# Patient Record
Sex: FEMALE | Race: WHITE | NOT HISPANIC OR LATINO | Employment: OTHER | URBAN - METROPOLITAN AREA
[De-identification: names, ages, dates, MRNs, and addresses within clinical notes are randomized per-mention and may not be internally consistent; named-entity substitution may affect disease eponyms.]

---

## 2017-08-10 ENCOUNTER — ALLSCRIPTS OFFICE VISIT (OUTPATIENT)
Dept: OTHER | Facility: OTHER | Age: 32
End: 2017-08-10

## 2017-08-10 ENCOUNTER — APPOINTMENT (OUTPATIENT)
Dept: RADIOLOGY | Facility: OTHER | Age: 32
End: 2017-08-10
Payer: COMMERCIAL

## 2017-08-10 ENCOUNTER — TRANSCRIBE ORDERS (OUTPATIENT)
Dept: ADMINISTRATIVE | Facility: HOSPITAL | Age: 32
End: 2017-08-10

## 2017-08-10 DIAGNOSIS — M25.561 RIGHT KNEE PAIN, UNSPECIFIED CHRONICITY: Primary | ICD-10-CM

## 2017-08-10 DIAGNOSIS — S83.511A SPRAIN OF ANTERIOR CRUCIATE LIGAMENT OF RIGHT KNEE: ICD-10-CM

## 2017-08-10 DIAGNOSIS — M25.561 PAIN IN RIGHT KNEE: ICD-10-CM

## 2017-08-10 PROCEDURE — 73564 X-RAY EXAM KNEE 4 OR MORE: CPT

## 2017-08-10 PROCEDURE — 73562 X-RAY EXAM OF KNEE 3: CPT

## 2017-08-11 ENCOUNTER — HOSPITAL ENCOUNTER (OUTPATIENT)
Dept: RADIOLOGY | Age: 32
Discharge: HOME | End: 2017-08-11
Payer: COMMERCIAL

## 2017-08-11 DIAGNOSIS — M25.561 RIGHT KNEE PAIN, UNSPECIFIED CHRONICITY: ICD-10-CM

## 2017-08-11 PROCEDURE — 73721 MRI JNT OF LWR EXTRE W/O DYE: CPT

## 2017-08-14 ENCOUNTER — ALLSCRIPTS OFFICE VISIT (OUTPATIENT)
Dept: OTHER | Facility: OTHER | Age: 32
End: 2017-08-14

## 2017-08-17 ENCOUNTER — GENERIC CONVERSION - ENCOUNTER (OUTPATIENT)
Dept: OTHER | Facility: OTHER | Age: 32
End: 2017-08-17

## 2017-08-29 ENCOUNTER — ALLSCRIPTS OFFICE VISIT (OUTPATIENT)
Dept: OTHER | Facility: OTHER | Age: 32
End: 2017-08-29

## 2017-09-06 RX ORDER — NORGESTIMATE AND ETHINYL ESTRADIOL 7DAYSX3 LO
1 KIT ORAL DAILY
COMMUNITY
End: 2018-02-19 | Stop reason: SDUPTHER

## 2017-09-15 ENCOUNTER — HOSPITAL ENCOUNTER (OUTPATIENT)
Facility: HOSPITAL | Age: 32
Setting detail: OUTPATIENT SURGERY
Discharge: HOME | End: 2017-09-15
Attending: ORTHOPAEDIC SURGERY | Admitting: ORTHOPAEDIC SURGERY
Payer: COMMERCIAL

## 2017-09-15 ENCOUNTER — ANESTHESIA (OUTPATIENT)
Dept: PERIOP | Facility: HOSPITAL | Age: 32
End: 2017-09-15
Payer: COMMERCIAL

## 2017-09-15 ENCOUNTER — ANESTHESIA EVENT (OUTPATIENT)
Dept: PERIOP | Facility: HOSPITAL | Age: 32
End: 2017-09-15
Payer: COMMERCIAL

## 2017-09-15 ENCOUNTER — APPOINTMENT (OUTPATIENT)
Dept: RADIOLOGY | Facility: HOSPITAL | Age: 32
End: 2017-09-15
Payer: COMMERCIAL

## 2017-09-15 VITALS
WEIGHT: 160 LBS | DIASTOLIC BLOOD PRESSURE: 60 MMHG | SYSTOLIC BLOOD PRESSURE: 97 MMHG | BODY MASS INDEX: 25.71 KG/M2 | HEART RATE: 62 BPM | TEMPERATURE: 99.6 F | OXYGEN SATURATION: 100 % | HEIGHT: 66 IN | RESPIRATION RATE: 19 BRPM

## 2017-09-15 PROBLEM — S83.511A SPRAIN OF ANTERIOR CRUCIATE LIGAMENT OF RIGHT KNEE: Status: ACTIVE | Noted: 2017-09-15

## 2017-09-15 LAB — EXT PREGNANCY TEST URINE: NEGATIVE

## 2017-09-15 PROCEDURE — C1713 ANCHOR/SCREW BN/BN,TIS/BN: HCPCS | Performed by: ORTHOPAEDIC SURGERY

## 2017-09-15 PROCEDURE — 81025 URINE PREGNANCY TEST: CPT | Performed by: ANESTHESIOLOGY

## 2017-09-15 DEVICE — SHEATH SMALL TIBIAL BIO-INTRAFIX: Type: IMPLANTABLE DEVICE | Site: KNEE | Status: FUNCTIONAL

## 2017-09-15 DEVICE — IMPLANTABLE DEVICE: Type: IMPLANTABLE DEVICE | Site: KNEE | Status: FUNCTIONAL

## 2017-09-15 DEVICE — FIXATION ACL TIGHTROPE RT DBL LOADED PASSING SUT: Type: IMPLANTABLE DEVICE | Site: KNEE | Status: FUNCTIONAL

## 2017-09-15 RX ORDER — BUPIVACAINE HYDROCHLORIDE 2.5 MG/ML
INJECTION, SOLUTION INFILTRATION; PERINEURAL AS NEEDED
Status: DISCONTINUED | OUTPATIENT
Start: 2017-09-15 | End: 2017-09-15 | Stop reason: HOSPADM

## 2017-09-15 RX ORDER — METOCLOPRAMIDE HYDROCHLORIDE 5 MG/ML
INJECTION INTRAMUSCULAR; INTRAVENOUS AS NEEDED
Status: DISCONTINUED | OUTPATIENT
Start: 2017-09-15 | End: 2017-09-15 | Stop reason: SURG

## 2017-09-15 RX ORDER — ROPIVACAINE HYDROCHLORIDE 5 MG/ML
INJECTION, SOLUTION EPIDURAL; INFILTRATION; PERINEURAL AS NEEDED
Status: DISCONTINUED | OUTPATIENT
Start: 2017-09-15 | End: 2017-09-15 | Stop reason: SURG

## 2017-09-15 RX ORDER — ONDANSETRON 2 MG/ML
4 INJECTION INTRAMUSCULAR; INTRAVENOUS ONCE AS NEEDED
Status: DISCONTINUED | OUTPATIENT
Start: 2017-09-15 | End: 2017-09-15 | Stop reason: HOSPADM

## 2017-09-15 RX ORDER — MIDAZOLAM HYDROCHLORIDE 1 MG/ML
INJECTION INTRAMUSCULAR; INTRAVENOUS AS NEEDED
Status: DISCONTINUED | OUTPATIENT
Start: 2017-09-15 | End: 2017-09-15 | Stop reason: SURG

## 2017-09-15 RX ORDER — SODIUM CHLORIDE, SODIUM LACTATE, POTASSIUM CHLORIDE, CALCIUM CHLORIDE 600; 310; 30; 20 MG/100ML; MG/100ML; MG/100ML; MG/100ML
50 INJECTION, SOLUTION INTRAVENOUS CONTINUOUS
Status: DISCONTINUED | OUTPATIENT
Start: 2017-09-15 | End: 2017-09-15 | Stop reason: HOSPADM

## 2017-09-15 RX ORDER — ACETAMINOPHEN 325 MG/1
650 TABLET ORAL EVERY 6 HOURS PRN
Status: DISCONTINUED | OUTPATIENT
Start: 2017-09-15 | End: 2017-09-15 | Stop reason: HOSPADM

## 2017-09-15 RX ORDER — PROPOFOL 10 MG/ML
INJECTION, EMULSION INTRAVENOUS AS NEEDED
Status: DISCONTINUED | OUTPATIENT
Start: 2017-09-15 | End: 2017-09-15 | Stop reason: SURG

## 2017-09-15 RX ORDER — FENTANYL CITRATE/PF 50 MCG/ML
25 SYRINGE (ML) INJECTION
Status: DISCONTINUED | OUTPATIENT
Start: 2017-09-15 | End: 2017-09-15 | Stop reason: HOSPADM

## 2017-09-15 RX ORDER — OXYCODONE HYDROCHLORIDE 5 MG/1
5 TABLET ORAL EVERY 4 HOURS PRN
Status: DISCONTINUED | OUTPATIENT
Start: 2017-09-15 | End: 2017-09-15 | Stop reason: HOSPADM

## 2017-09-15 RX ORDER — ONDANSETRON 2 MG/ML
INJECTION INTRAMUSCULAR; INTRAVENOUS AS NEEDED
Status: DISCONTINUED | OUTPATIENT
Start: 2017-09-15 | End: 2017-09-15 | Stop reason: SURG

## 2017-09-15 RX ORDER — OXYCODONE HYDROCHLORIDE 5 MG/1
TABLET ORAL
Qty: 30 TABLET | Refills: 0 | Status: SHIPPED | OUTPATIENT
Start: 2017-09-15 | End: 2018-08-02 | Stop reason: ALTCHOICE

## 2017-09-15 RX ORDER — MAGNESIUM HYDROXIDE 1200 MG/15ML
LIQUID ORAL AS NEEDED
Status: DISCONTINUED | OUTPATIENT
Start: 2017-09-15 | End: 2017-09-15 | Stop reason: HOSPADM

## 2017-09-15 RX ORDER — LIDOCAINE HYDROCHLORIDE 10 MG/ML
INJECTION, SOLUTION INFILTRATION; PERINEURAL AS NEEDED
Status: DISCONTINUED | OUTPATIENT
Start: 2017-09-15 | End: 2017-09-15 | Stop reason: SURG

## 2017-09-15 RX ORDER — SODIUM CHLORIDE, SODIUM LACTATE, POTASSIUM CHLORIDE, CALCIUM CHLORIDE 600; 310; 30; 20 MG/100ML; MG/100ML; MG/100ML; MG/100ML
125 INJECTION, SOLUTION INTRAVENOUS CONTINUOUS
Status: DISCONTINUED | OUTPATIENT
Start: 2017-09-15 | End: 2017-09-15 | Stop reason: HOSPADM

## 2017-09-15 RX ORDER — ONDANSETRON 2 MG/ML
4 INJECTION INTRAMUSCULAR; INTRAVENOUS EVERY 6 HOURS PRN
Status: DISCONTINUED | OUTPATIENT
Start: 2017-09-15 | End: 2017-09-15 | Stop reason: HOSPADM

## 2017-09-15 RX ORDER — MEPERIDINE HYDROCHLORIDE 25 MG/ML
12.5 INJECTION INTRAMUSCULAR; INTRAVENOUS; SUBCUTANEOUS AS NEEDED
Status: DISCONTINUED | OUTPATIENT
Start: 2017-09-15 | End: 2017-09-15 | Stop reason: HOSPADM

## 2017-09-15 RX ORDER — FENTANYL CITRATE 50 UG/ML
INJECTION, SOLUTION INTRAMUSCULAR; INTRAVENOUS AS NEEDED
Status: DISCONTINUED | OUTPATIENT
Start: 2017-09-15 | End: 2017-09-15 | Stop reason: SURG

## 2017-09-15 RX ORDER — ALBUTEROL SULFATE 2.5 MG/3ML
2.5 SOLUTION RESPIRATORY (INHALATION) ONCE AS NEEDED
Status: DISCONTINUED | OUTPATIENT
Start: 2017-09-15 | End: 2017-09-15 | Stop reason: HOSPADM

## 2017-09-15 RX ORDER — OXYCODONE HYDROCHLORIDE 10 MG/1
10 TABLET ORAL EVERY 4 HOURS PRN
Status: DISCONTINUED | OUTPATIENT
Start: 2017-09-15 | End: 2017-09-15 | Stop reason: HOSPADM

## 2017-09-15 RX ADMIN — FENTANYL CITRATE 25 MCG: 50 INJECTION, SOLUTION INTRAMUSCULAR; INTRAVENOUS at 10:29

## 2017-09-15 RX ADMIN — ROPIVACAINE HYDROCHLORIDE 15 ML: 5 INJECTION, SOLUTION EPIDURAL; INFILTRATION; PERINEURAL at 09:05

## 2017-09-15 RX ADMIN — SODIUM CHLORIDE, SODIUM LACTATE, POTASSIUM CHLORIDE, AND CALCIUM CHLORIDE 125 ML/HR: .6; .31; .03; .02 INJECTION, SOLUTION INTRAVENOUS at 12:32

## 2017-09-15 RX ADMIN — MIDAZOLAM HYDROCHLORIDE 2 MG: 1 INJECTION, SOLUTION INTRAMUSCULAR; INTRAVENOUS at 10:03

## 2017-09-15 RX ADMIN — SODIUM CHLORIDE, SODIUM LACTATE, POTASSIUM CHLORIDE, AND CALCIUM CHLORIDE 50 ML/HR: .6; .31; .03; .02 INJECTION, SOLUTION INTRAVENOUS at 07:45

## 2017-09-15 RX ADMIN — FENTANYL CITRATE 25 MCG: 50 INJECTION INTRAMUSCULAR; INTRAVENOUS at 12:31

## 2017-09-15 RX ADMIN — LIDOCAINE HYDROCHLORIDE 50 MG: 10 INJECTION, SOLUTION INFILTRATION; PERINEURAL at 10:05

## 2017-09-15 RX ADMIN — OXYCODONE HYDROCHLORIDE 5 MG: 5 TABLET ORAL at 13:31

## 2017-09-15 RX ADMIN — CEFAZOLIN SODIUM 2000 MG: 2 SOLUTION INTRAVENOUS at 10:15

## 2017-09-15 RX ADMIN — FENTANYL CITRATE 25 MCG: 50 INJECTION, SOLUTION INTRAMUSCULAR; INTRAVENOUS at 10:41

## 2017-09-15 RX ADMIN — PROPOFOL 200 MG: 10 INJECTION, EMULSION INTRAVENOUS at 10:05

## 2017-09-15 RX ADMIN — ONDANSETRON 4 MG: 2 INJECTION INTRAMUSCULAR; INTRAVENOUS at 10:13

## 2017-09-15 RX ADMIN — FENTANYL CITRATE 25 MCG: 50 INJECTION INTRAMUSCULAR; INTRAVENOUS at 12:09

## 2017-09-15 RX ADMIN — ROPIVACAINE HYDROCHLORIDE 30 ML: 5 INJECTION, SOLUTION EPIDURAL; INFILTRATION; PERINEURAL at 08:56

## 2017-09-15 RX ADMIN — OXYCODONE HYDROCHLORIDE 5 MG: 5 TABLET ORAL at 14:17

## 2017-09-15 RX ADMIN — DEXAMETHASONE SODIUM PHOSPHATE 10 MG: 10 INJECTION INTRAMUSCULAR; INTRAVENOUS at 10:10

## 2017-09-15 RX ADMIN — FENTANYL CITRATE 50 MCG: 50 INJECTION, SOLUTION INTRAMUSCULAR; INTRAVENOUS at 10:05

## 2017-09-15 RX ADMIN — SODIUM CHLORIDE, SODIUM LACTATE, POTASSIUM CHLORIDE, AND CALCIUM CHLORIDE: .6; .31; .03; .02 INJECTION, SOLUTION INTRAVENOUS at 08:45

## 2017-09-15 RX ADMIN — METOCLOPRAMIDE 5 MG: 5 INJECTION, SOLUTION INTRAMUSCULAR; INTRAVENOUS at 10:13

## 2017-09-19 ENCOUNTER — HOSPITAL ENCOUNTER (OUTPATIENT)
Dept: RADIOLOGY | Facility: HOSPITAL | Age: 32
Discharge: HOME | End: 2017-09-19
Attending: ORTHOPAEDIC SURGERY
Payer: COMMERCIAL

## 2017-09-19 ENCOUNTER — ALLSCRIPTS OFFICE VISIT (OUTPATIENT)
Dept: OTHER | Facility: OTHER | Age: 32
End: 2017-09-19

## 2017-09-19 DIAGNOSIS — S83.511A SPRAIN OF ANTERIOR CRUCIATE LIGAMENT OF RIGHT KNEE: ICD-10-CM

## 2017-09-19 PROCEDURE — 73560 X-RAY EXAM OF KNEE 1 OR 2: CPT

## 2017-09-20 ENCOUNTER — APPOINTMENT (OUTPATIENT)
Dept: PHYSICAL THERAPY | Facility: CLINIC | Age: 32
End: 2017-09-20
Payer: COMMERCIAL

## 2017-09-20 ENCOUNTER — GENERIC CONVERSION - ENCOUNTER (OUTPATIENT)
Dept: OTHER | Facility: OTHER | Age: 32
End: 2017-09-20

## 2017-09-20 DIAGNOSIS — S83.511A SPRAIN OF ANTERIOR CRUCIATE LIGAMENT OF RIGHT KNEE: ICD-10-CM

## 2017-09-20 PROCEDURE — G8978 MOBILITY CURRENT STATUS: HCPCS

## 2017-09-20 PROCEDURE — 97161 PT EVAL LOW COMPLEX 20 MIN: CPT

## 2017-09-20 PROCEDURE — G8979 MOBILITY GOAL STATUS: HCPCS

## 2017-09-22 ENCOUNTER — APPOINTMENT (OUTPATIENT)
Dept: PHYSICAL THERAPY | Facility: CLINIC | Age: 32
End: 2017-09-22
Payer: COMMERCIAL

## 2017-09-22 PROCEDURE — 97014 ELECTRIC STIMULATION THERAPY: CPT

## 2017-09-22 PROCEDURE — 97140 MANUAL THERAPY 1/> REGIONS: CPT

## 2017-09-22 PROCEDURE — 97110 THERAPEUTIC EXERCISES: CPT

## 2017-09-26 ENCOUNTER — APPOINTMENT (OUTPATIENT)
Dept: PHYSICAL THERAPY | Facility: CLINIC | Age: 32
End: 2017-09-26
Payer: COMMERCIAL

## 2017-09-27 ENCOUNTER — APPOINTMENT (OUTPATIENT)
Dept: PHYSICAL THERAPY | Facility: CLINIC | Age: 32
End: 2017-09-27
Payer: COMMERCIAL

## 2017-09-27 PROCEDURE — 97110 THERAPEUTIC EXERCISES: CPT

## 2017-09-27 PROCEDURE — 97014 ELECTRIC STIMULATION THERAPY: CPT

## 2017-09-27 PROCEDURE — 97140 MANUAL THERAPY 1/> REGIONS: CPT

## 2017-09-29 ENCOUNTER — APPOINTMENT (OUTPATIENT)
Dept: PHYSICAL THERAPY | Facility: CLINIC | Age: 32
End: 2017-09-29
Payer: COMMERCIAL

## 2017-09-29 PROCEDURE — 97140 MANUAL THERAPY 1/> REGIONS: CPT

## 2017-09-29 PROCEDURE — 97110 THERAPEUTIC EXERCISES: CPT

## 2017-10-02 ENCOUNTER — APPOINTMENT (OUTPATIENT)
Dept: PHYSICAL THERAPY | Facility: CLINIC | Age: 32
End: 2017-10-02
Payer: COMMERCIAL

## 2017-10-02 PROCEDURE — 97110 THERAPEUTIC EXERCISES: CPT

## 2017-10-02 PROCEDURE — 97140 MANUAL THERAPY 1/> REGIONS: CPT

## 2017-10-02 PROCEDURE — 97010 HOT OR COLD PACKS THERAPY: CPT

## 2017-10-04 ENCOUNTER — APPOINTMENT (OUTPATIENT)
Dept: PHYSICAL THERAPY | Facility: CLINIC | Age: 32
End: 2017-10-04
Payer: COMMERCIAL

## 2017-10-04 PROCEDURE — 97110 THERAPEUTIC EXERCISES: CPT

## 2017-10-04 PROCEDURE — 97014 ELECTRIC STIMULATION THERAPY: CPT

## 2017-10-06 ENCOUNTER — APPOINTMENT (OUTPATIENT)
Dept: PHYSICAL THERAPY | Facility: CLINIC | Age: 32
End: 2017-10-06
Payer: COMMERCIAL

## 2017-10-06 PROCEDURE — 97014 ELECTRIC STIMULATION THERAPY: CPT

## 2017-10-06 PROCEDURE — 97110 THERAPEUTIC EXERCISES: CPT

## 2017-10-09 ENCOUNTER — APPOINTMENT (OUTPATIENT)
Dept: PHYSICAL THERAPY | Facility: CLINIC | Age: 32
End: 2017-10-09
Payer: COMMERCIAL

## 2017-10-09 PROCEDURE — 97110 THERAPEUTIC EXERCISES: CPT

## 2017-10-09 PROCEDURE — 97140 MANUAL THERAPY 1/> REGIONS: CPT

## 2017-10-11 ENCOUNTER — APPOINTMENT (OUTPATIENT)
Dept: PHYSICAL THERAPY | Facility: CLINIC | Age: 32
End: 2017-10-11
Payer: COMMERCIAL

## 2017-10-11 PROCEDURE — 97140 MANUAL THERAPY 1/> REGIONS: CPT

## 2017-10-11 PROCEDURE — 97530 THERAPEUTIC ACTIVITIES: CPT

## 2017-10-11 PROCEDURE — 97110 THERAPEUTIC EXERCISES: CPT

## 2017-10-12 ENCOUNTER — APPOINTMENT (OUTPATIENT)
Dept: PHYSICAL THERAPY | Facility: CLINIC | Age: 32
End: 2017-10-12
Payer: COMMERCIAL

## 2017-10-12 PROCEDURE — 97014 ELECTRIC STIMULATION THERAPY: CPT

## 2017-10-12 PROCEDURE — 97110 THERAPEUTIC EXERCISES: CPT

## 2017-10-16 ENCOUNTER — APPOINTMENT (OUTPATIENT)
Dept: PHYSICAL THERAPY | Facility: CLINIC | Age: 32
End: 2017-10-16
Payer: COMMERCIAL

## 2017-10-18 ENCOUNTER — APPOINTMENT (OUTPATIENT)
Dept: PHYSICAL THERAPY | Facility: CLINIC | Age: 32
End: 2017-10-18
Payer: COMMERCIAL

## 2017-10-18 PROCEDURE — 97110 THERAPEUTIC EXERCISES: CPT

## 2017-10-18 PROCEDURE — 97530 THERAPEUTIC ACTIVITIES: CPT

## 2017-10-20 ENCOUNTER — APPOINTMENT (OUTPATIENT)
Dept: PHYSICAL THERAPY | Facility: CLINIC | Age: 32
End: 2017-10-20
Payer: COMMERCIAL

## 2017-10-20 PROCEDURE — 97110 THERAPEUTIC EXERCISES: CPT

## 2017-10-23 ENCOUNTER — APPOINTMENT (OUTPATIENT)
Dept: PHYSICAL THERAPY | Facility: CLINIC | Age: 32
End: 2017-10-23
Payer: COMMERCIAL

## 2017-10-23 PROCEDURE — 97110 THERAPEUTIC EXERCISES: CPT

## 2017-10-27 ENCOUNTER — APPOINTMENT (OUTPATIENT)
Dept: PHYSICAL THERAPY | Facility: CLINIC | Age: 32
End: 2017-10-27
Payer: COMMERCIAL

## 2017-10-27 PROCEDURE — 97110 THERAPEUTIC EXERCISES: CPT

## 2017-10-30 ENCOUNTER — APPOINTMENT (OUTPATIENT)
Dept: PHYSICAL THERAPY | Facility: CLINIC | Age: 32
End: 2017-10-30
Payer: COMMERCIAL

## 2017-10-30 PROCEDURE — 97110 THERAPEUTIC EXERCISES: CPT

## 2017-11-01 ENCOUNTER — APPOINTMENT (OUTPATIENT)
Dept: PHYSICAL THERAPY | Facility: CLINIC | Age: 32
End: 2017-11-01
Payer: COMMERCIAL

## 2017-11-01 PROCEDURE — 97110 THERAPEUTIC EXERCISES: CPT

## 2017-11-06 ENCOUNTER — APPOINTMENT (OUTPATIENT)
Dept: PHYSICAL THERAPY | Facility: CLINIC | Age: 32
End: 2017-11-06
Payer: COMMERCIAL

## 2017-11-06 PROCEDURE — 97110 THERAPEUTIC EXERCISES: CPT

## 2017-11-08 ENCOUNTER — APPOINTMENT (OUTPATIENT)
Dept: PHYSICAL THERAPY | Facility: CLINIC | Age: 32
End: 2017-11-08
Payer: COMMERCIAL

## 2017-11-08 PROCEDURE — 97110 THERAPEUTIC EXERCISES: CPT

## 2017-11-08 PROCEDURE — 97530 THERAPEUTIC ACTIVITIES: CPT

## 2017-11-13 ENCOUNTER — APPOINTMENT (OUTPATIENT)
Dept: PHYSICAL THERAPY | Facility: CLINIC | Age: 32
End: 2017-11-13
Payer: COMMERCIAL

## 2017-11-13 PROCEDURE — 97110 THERAPEUTIC EXERCISES: CPT

## 2017-11-13 PROCEDURE — 97530 THERAPEUTIC ACTIVITIES: CPT

## 2017-11-15 ENCOUNTER — APPOINTMENT (OUTPATIENT)
Dept: PHYSICAL THERAPY | Facility: CLINIC | Age: 32
End: 2017-11-15
Payer: COMMERCIAL

## 2017-11-15 PROCEDURE — 97110 THERAPEUTIC EXERCISES: CPT

## 2017-11-20 ENCOUNTER — APPOINTMENT (OUTPATIENT)
Dept: PHYSICAL THERAPY | Facility: CLINIC | Age: 32
End: 2017-11-20
Payer: COMMERCIAL

## 2017-11-20 PROCEDURE — 97530 THERAPEUTIC ACTIVITIES: CPT

## 2017-11-20 PROCEDURE — 97116 GAIT TRAINING THERAPY: CPT

## 2017-11-20 PROCEDURE — 97110 THERAPEUTIC EXERCISES: CPT

## 2017-11-22 ENCOUNTER — APPOINTMENT (OUTPATIENT)
Dept: PHYSICAL THERAPY | Facility: CLINIC | Age: 32
End: 2017-11-22
Payer: COMMERCIAL

## 2017-11-27 ENCOUNTER — APPOINTMENT (OUTPATIENT)
Dept: PHYSICAL THERAPY | Facility: CLINIC | Age: 32
End: 2017-11-27
Payer: COMMERCIAL

## 2017-11-28 ENCOUNTER — ALLSCRIPTS OFFICE VISIT (OUTPATIENT)
Dept: OTHER | Facility: OTHER | Age: 32
End: 2017-11-28

## 2017-11-29 ENCOUNTER — APPOINTMENT (OUTPATIENT)
Dept: PHYSICAL THERAPY | Facility: CLINIC | Age: 32
End: 2017-11-29
Payer: COMMERCIAL

## 2017-11-29 PROCEDURE — 97110 THERAPEUTIC EXERCISES: CPT

## 2017-12-04 ENCOUNTER — APPOINTMENT (OUTPATIENT)
Dept: PHYSICAL THERAPY | Facility: CLINIC | Age: 32
End: 2017-12-04
Payer: COMMERCIAL

## 2017-12-06 ENCOUNTER — APPOINTMENT (OUTPATIENT)
Dept: PHYSICAL THERAPY | Facility: CLINIC | Age: 32
End: 2017-12-06
Payer: COMMERCIAL

## 2017-12-06 PROCEDURE — 97110 THERAPEUTIC EXERCISES: CPT

## 2017-12-11 ENCOUNTER — APPOINTMENT (OUTPATIENT)
Dept: PHYSICAL THERAPY | Facility: CLINIC | Age: 32
End: 2017-12-11
Payer: COMMERCIAL

## 2017-12-13 ENCOUNTER — APPOINTMENT (OUTPATIENT)
Dept: PHYSICAL THERAPY | Facility: CLINIC | Age: 32
End: 2017-12-13
Payer: COMMERCIAL

## 2017-12-20 ENCOUNTER — APPOINTMENT (OUTPATIENT)
Dept: PHYSICAL THERAPY | Facility: CLINIC | Age: 32
End: 2017-12-20
Payer: COMMERCIAL

## 2017-12-20 PROCEDURE — 97530 THERAPEUTIC ACTIVITIES: CPT

## 2017-12-20 PROCEDURE — 97112 NEUROMUSCULAR REEDUCATION: CPT

## 2017-12-20 PROCEDURE — 97110 THERAPEUTIC EXERCISES: CPT

## 2017-12-26 ENCOUNTER — APPOINTMENT (OUTPATIENT)
Dept: PHYSICAL THERAPY | Facility: CLINIC | Age: 32
End: 2017-12-26
Payer: COMMERCIAL

## 2017-12-27 ENCOUNTER — APPOINTMENT (OUTPATIENT)
Dept: PHYSICAL THERAPY | Facility: CLINIC | Age: 32
End: 2017-12-27
Payer: COMMERCIAL

## 2018-01-03 ENCOUNTER — APPOINTMENT (OUTPATIENT)
Dept: PHYSICAL THERAPY | Facility: CLINIC | Age: 33
End: 2018-01-03
Payer: COMMERCIAL

## 2018-01-03 PROCEDURE — 97530 THERAPEUTIC ACTIVITIES: CPT

## 2018-01-03 PROCEDURE — 97110 THERAPEUTIC EXERCISES: CPT

## 2018-01-10 ENCOUNTER — APPOINTMENT (OUTPATIENT)
Dept: PHYSICAL THERAPY | Facility: CLINIC | Age: 33
End: 2018-01-10
Payer: COMMERCIAL

## 2018-01-10 PROCEDURE — 97116 GAIT TRAINING THERAPY: CPT

## 2018-01-10 PROCEDURE — 97110 THERAPEUTIC EXERCISES: CPT

## 2018-01-10 PROCEDURE — 97530 THERAPEUTIC ACTIVITIES: CPT

## 2018-01-12 VITALS
DIASTOLIC BLOOD PRESSURE: 60 MMHG | BODY MASS INDEX: 25.92 KG/M2 | HEIGHT: 65 IN | SYSTOLIC BLOOD PRESSURE: 124 MMHG | HEART RATE: 56 BPM | WEIGHT: 155.6 LBS | RESPIRATION RATE: 14 BRPM

## 2018-01-12 VITALS
DIASTOLIC BLOOD PRESSURE: 81 MMHG | BODY MASS INDEX: 25.74 KG/M2 | SYSTOLIC BLOOD PRESSURE: 117 MMHG | HEART RATE: 101 BPM | WEIGHT: 159.5 LBS

## 2018-01-13 VITALS
SYSTOLIC BLOOD PRESSURE: 109 MMHG | WEIGHT: 155 LBS | DIASTOLIC BLOOD PRESSURE: 72 MMHG | HEART RATE: 56 BPM | BODY MASS INDEX: 24.91 KG/M2 | HEIGHT: 66 IN

## 2018-01-13 VITALS
HEIGHT: 66 IN | SYSTOLIC BLOOD PRESSURE: 122 MMHG | BODY MASS INDEX: 25.27 KG/M2 | WEIGHT: 157.25 LBS | DIASTOLIC BLOOD PRESSURE: 83 MMHG | HEART RATE: 68 BPM

## 2018-01-13 NOTE — MISCELLANEOUS
Message  Return to work or school:   Ky Cortés is under my professional care  She was seen in my office on 11/28/2017   She is able to return to work on  11/29/2017    She can perform work without limitations  Isma Rabago PA-C        Future Appointments    Signatures   Electronically signed by : Isma Rabago, [facility]; Nov 28 2017  3:30PM EST                       (Author)    Electronically signed by : WILMER England ; Nov 28 2017  4:04PM EST                       (Author)

## 2018-01-14 VITALS
BODY MASS INDEX: 24.39 KG/M2 | WEIGHT: 151.13 LBS | SYSTOLIC BLOOD PRESSURE: 122 MMHG | HEART RATE: 99 BPM | DIASTOLIC BLOOD PRESSURE: 85 MMHG

## 2018-01-15 ENCOUNTER — APPOINTMENT (OUTPATIENT)
Dept: PHYSICAL THERAPY | Facility: CLINIC | Age: 33
End: 2018-01-15
Payer: COMMERCIAL

## 2018-01-15 PROCEDURE — 97140 MANUAL THERAPY 1/> REGIONS: CPT

## 2018-01-15 PROCEDURE — 97530 THERAPEUTIC ACTIVITIES: CPT

## 2018-01-15 PROCEDURE — 97110 THERAPEUTIC EXERCISES: CPT

## 2018-01-15 NOTE — PROGRESS NOTES
Preliminary Nursing Report                Patient Information    Initial Encounter Entry Date:   2017 12:02 PM EST (Automated Transmission Automated Transmission)       Last Modified:   {ParH  ModifiedOn}              Legal Name: Jayde Acuna        Social Security Number:        YOB: 1985        Age (years): 33        Gender: F        Body Mass Index (BMI): 25 kg/m2        Height: 66 in  Weight: 157 lbs (71 kgs)           Address:   [address] [place] [address]              Phone: [phone]   (consent to leave messages)        Email:        Ethnicity: Decline to State        Anglican:        Marital Status:        Preferred Language: English        Race: Other Race                    Patient Insurance Information        Primary Insurance Information Carrier Name: {Primary  CarrierName}           Carrier Address:   {Primary  CarrierAddress}              Carrier Phone: {Primary  CarrierPhone}          Group Number: {Primary  GroupNumber}          Policy Number: {Primary  PolicyNumber}          Insured Name: {Primary  InsuredName}          Insured : {Primary  InsuredDOB}          Relationship to Insured: {Primary  RelationshiptoInsured}           Secondary Insurance Information Carrier Name: {Secondary  CarrierName}           Carrier Address:   {Secondary  CarrierAddress}              Carrier Phone: {Secondary  CarrierPhone}          Group Number: {Secondary  GroupNumber}          Policy Number: {Secondary  PolicyNumber}          Insured Name: {Secondary  InsuredName}          Insured : {Secondary  InsuredDOB}          Relationship to Insured: {Secondary  RelationshiptoInsured}                       Health Profile   Booking #:   Martin Navas #: [id number]               DOS: 09/15/2017    Surgery : KNEE ARTHROSCOPY/SURGERY    Antonella Mort     Add'l Procedures/Notes:     Surgery Risk: Intermediate          Precautions          Allergies    Amoxicillin TABS       Clinical Comments: Reaction Type: , Reaction: , Severity:        No Known Environmental Allergies       No Known Food Allergies       Clinical Comments: Reaction Type: , Reaction: , Severity:              Medications    Norgestim-Eth Estrad Triphasic 0 18/0 215/0 25 MG-25 MCG Oral Tablet               Conditions    Cervical cancer screening       Encounter for gynecological examination without abnormal finding       General counseling for prescription of oral contraceptives       Need for prophylactic vaccination and inoculation against influenza       Right knee pain       Rupture of anterior cruciate ligament of right knee, initial encounter       Screening for HPV (human papillomavirus)       Vitamin D deficiency               Family History    None             Surgical History    None             Social History    Being A Social Drinker       Caffeine Use       Denies Drug Use       Denies Travel       Drinks coffee       Never A Smoker       Non smoker / tobacco user       Sexually active       Social alcohol use       Two children       Uses Safety Equipment       Uses Safety Equipment - Seatbelts                               Patient Instructions       Medical Procedure Risk  NPO Instructions   The day before surgery it is recommended to have a light dinner at your usual time and you are allowed a light snack early in the evening  Do not eat anything heavy or eat a big meal after 7pm  Do not eat or drink anything after midnight prior to your surgery  If you are supposed to take any of your medications, do so with a sip of water  Failure to follow these instructions can lead to an increased risk of lung complications and may result in a delay or cancellation of your procedure  If you have any questions, contact your institution for further instructions  No candy, no gum, no mints, no chewing tobacco                Testing Considerations       ?  Pregnancy Test t  Consider a urine pregnancy test on the morning of surgery  Triggered by: Age or Facility Rec, Gender               Consultations       No recommendations for this classification  Miscellaneous Questions         Question: Are you able to walk up a flight of stairs, walk up a hill or do heavy housework WITHOUT having chest pain or shortness of breath? Answer: YES                   Allergies/Conditions/Medications Not Found        The following were not recognized by our system when generating the recommendations  Please consider if this would impact any preoperative protocols  ? Being A Social Drinker       ? Caffeine Use       ? Denies Drug Use       ? Denies Travel       ? Drinks coffee       ? Never A Smoker       ? Non smoker / tobacco user       ? Sexually active       ? Social alcohol use       ? Two children       ? Uses Safety Equipment       ? Uses Safety Equipment - Seatbelts       ? Yovani Ahsan Triphasic 0 18/0 215/0 25 MG-25 MCG Oral Tablet                  Appointment Information         Date:    09/15/2017        Location:    [facility]        Address:           Directions:                      Footnotes revision 14      ?? Denotes a free-text entry  Legal Disclaimer: Any and all recommendations and services provided herein are designed to assist in the preoperative care of the patient  Nothing contained herein is designed to replace, eliminate or alleviate the responsibility of the attending physician to supervise and determine the patient?s preoperative care and course of treatment  Failure to provide complete, accurate information may negatively impact the system?s ability to recommend the proper preoperative protocol  THE ATTENDING PHYSICIAN IS RESPONSIBLE TO REVIEW THE SUGGESTED PREOPERATIVE PROTOCOLS/COURSE OF TREATMENT AND PRESCRIBE THE FINAL COURSE OF PREOPERATIVE TREATMENT IN CONSULTATION WITH THE PATIENT  THE OLSET SYSTEM AND ITS MATERIALS ARE PROVIDED ? AS IS? WITHOUT WARRANTY OF ANY KIND, EXPRESS OR IMPLIED, INCLUDING, BUT NOT LIMITED TO, WARRANTIES OF PERFORMANCE OR MERCHANTABILITY OR FITNESS FOR A PARTICULAR PURPOSE  PATIENT AND PHYSICIANS HEREBY AGREE THAT THEIR USE OF THE MATERIALS AND RESOURCES ACT AS A CONSENT TO RELEASE AND WAIVE ePREOP FROM ANY AND ALL CLAIMS OF WARRANTY, TORT OR CONTRACT LAW OF ANY KIND  Electronically signed by:Cipriano CAPUTO    Sep 20 2017  2:05PM EST

## 2018-01-16 ENCOUNTER — GENERIC CONVERSION - ENCOUNTER (OUTPATIENT)
Dept: OTHER | Facility: OTHER | Age: 33
End: 2018-01-16

## 2018-01-24 ENCOUNTER — OFFICE VISIT (OUTPATIENT)
Dept: PHYSICAL THERAPY | Facility: CLINIC | Age: 33
End: 2018-01-24
Payer: COMMERCIAL

## 2018-01-24 VITALS
DIASTOLIC BLOOD PRESSURE: 58 MMHG | HEIGHT: 61 IN | WEIGHT: 153.5 LBS | BODY MASS INDEX: 28.98 KG/M2 | SYSTOLIC BLOOD PRESSURE: 102 MMHG

## 2018-01-24 DIAGNOSIS — M25.561 CHRONIC PAIN OF RIGHT KNEE: ICD-10-CM

## 2018-01-24 DIAGNOSIS — G89.29 CHRONIC PAIN OF RIGHT KNEE: ICD-10-CM

## 2018-01-24 PROCEDURE — 97110 THERAPEUTIC EXERCISES: CPT

## 2018-01-24 PROCEDURE — 97140 MANUAL THERAPY 1/> REGIONS: CPT

## 2018-01-24 PROCEDURE — 97530 THERAPEUTIC ACTIVITIES: CPT

## 2018-01-24 PROCEDURE — G8991 OTHER PT/OT GOAL STATUS: HCPCS | Performed by: PHYSICAL THERAPIST

## 2018-01-24 PROCEDURE — G8992 OTHER PT/OT  D/C STATUS: HCPCS | Performed by: PHYSICAL THERAPIST

## 2018-01-24 NOTE — PROGRESS NOTES
Daily Note     Today's date: 2018  Patient name: Ying Rios  : 1985  MRN: [id number]  Referring provider: Landy Alvarez MD  Dx: No diagnosis found  Subjective: Patient denies right knee pain prior to today's treatment  Objective: See treatment diary below  Precautions: R ACL repair 9/15/17    Specialty Daily Treatment Diary     Manual  18       Graston quad and patellar tendons   X           Exercise Diary  18       stepper 10 min       Prone quad stretch 30" x 4       HS stretch 30" x 4       Lateral step downs 6 inch  3x10       Wall sits 15 sec x 4       rockerboard squats 3x10       sls with cone  3 times       Leg press single leg squat Blue foam  50 lbs x 30        Side shuffling 20" x 4       Box jumps 6 inch x 20       Single leg sit to stand 2x10       Treadmill jogging 10 min           Modalities        none            Assessment: Tolerated treatment well  Patient complains of fatigue when performing single leg squats on the leg press machine  Plan: Continue per plan of care

## 2018-01-30 ENCOUNTER — OFFICE VISIT (OUTPATIENT)
Dept: OBGYN CLINIC | Facility: OTHER | Age: 33
End: 2018-01-30
Payer: COMMERCIAL

## 2018-01-30 VITALS
HEART RATE: 53 BPM | BODY MASS INDEX: 24.11 KG/M2 | HEIGHT: 66 IN | SYSTOLIC BLOOD PRESSURE: 113 MMHG | WEIGHT: 150 LBS | DIASTOLIC BLOOD PRESSURE: 68 MMHG

## 2018-01-30 DIAGNOSIS — S83.511D RUPTURE OF ANTERIOR CRUCIATE LIGAMENT OF RIGHT KNEE, SUBSEQUENT ENCOUNTER: Primary | ICD-10-CM

## 2018-01-30 PROCEDURE — 99213 OFFICE O/P EST LOW 20 MIN: CPT | Performed by: ORTHOPAEDIC SURGERY

## 2018-01-30 RX ORDER — MULTIVITAMIN
TABLET ORAL
COMMUNITY
End: 2018-08-02

## 2018-01-30 NOTE — PROGRESS NOTES
Patient returns 5 months out from right ACL reconstruction  She is doing great and has no complaints, she is eager to return to her full activities and may wait till next year to play soccer again  She is jogging on the treadmill and doing cutting and pivoting activities with the therapist without any complication or instability      Right knee well-healed arthroscopic portals and open incisions, stable Lachman exam and varus and valgus stress, negative posterior drawer, no tenderness over medial lateral joint line and no effusion    Doing well status post ACL reconstruction right knee  The patient is progressing along schedule and will return to her activities to tolerance, returning to soccer next year is reasonable and she does understand she has a chance of re-injuring the knee, she will think about whether she wants to take that chance, at this point the knee examines normally and I suspect she will not have further problems, we did discuss the importance of maintaining a fit an active lifestyle for the health of her knee in the future

## 2018-02-19 DIAGNOSIS — Z30.09 BIRTH CONTROL COUNSELING: Primary | ICD-10-CM

## 2018-02-19 RX ORDER — NORGESTIMATE AND ETHINYL ESTRADIOL 7DAYSX3 LO
1 KIT ORAL DAILY
Qty: 28 TABLET | Refills: 6 | Status: SHIPPED | OUTPATIENT
Start: 2018-02-19 | End: 2018-08-02

## 2018-03-02 NOTE — PROGRESS NOTES
DC summary    >30 days have past since pt was last seen for Dc  At her  she as experiencing no pain and had returned to running  She will be formally Dc'd at this time

## 2018-08-02 ENCOUNTER — OFFICE VISIT (OUTPATIENT)
Dept: OBGYN CLINIC | Facility: CLINIC | Age: 33
End: 2018-08-02
Payer: COMMERCIAL

## 2018-08-02 VITALS — DIASTOLIC BLOOD PRESSURE: 60 MMHG | BODY MASS INDEX: 26.18 KG/M2 | SYSTOLIC BLOOD PRESSURE: 122 MMHG | WEIGHT: 162.2 LBS

## 2018-08-02 DIAGNOSIS — N91.2 AMENORRHEA: Primary | ICD-10-CM

## 2018-08-02 PROBLEM — S83.511A RUPTURE OF ANTERIOR CRUCIATE LIGAMENT OF RIGHT KNEE: Status: RESOLVED | Noted: 2017-09-15 | Resolved: 2018-08-02

## 2018-08-02 PROCEDURE — 99213 OFFICE O/P EST LOW 20 MIN: CPT | Performed by: PHYSICIAN ASSISTANT

## 2018-08-02 PROCEDURE — 76801 OB US < 14 WKS SINGLE FETUS: CPT | Performed by: PHYSICIAN ASSISTANT

## 2018-08-02 NOTE — PROGRESS NOTES
Early OB Ultrasound Procedure Note  Vijay Jordan  YOB: 1985      Referring Physician: Rose Mary Carvajal MD     Technician: Study performed by the interpreting physician assistant    Indications:  amenorrhea   /60   Wt 73 6 kg (162 lb 3 2 oz)   LMP 06/03/2018   BMI 26 18 kg/m²     Patient's last menstrual period was 06/03/2018 , , with EGA of  8 weeks and 4   days    Patient denies vaginal bleeding or abdominal pain  She is on a prenatal vitamin  F5N6 no complications with previous pregancies  Procedure Details  A gestational sac is seen containing a yolk sac and a greco embryo  The embryonic crown-rump length measures 2 74  cm  and calculates to an estimated gestational age of 10 weeks and 4   days     Embryonic cardiac activity is  present  Description of fetal anatomy Normal  Description of ovaries: R corpus luteum cyst  Description of uterus: normal    Dr Arlen Johnson examined pt and US with me to confirm corpus luteum cyst    Findings:  Viable, greco intrauterine pregnancy at 9 weeks,  4 days, with a calculated EDC of March 3, 2019 by today's US

## 2018-08-16 ENCOUNTER — INITIAL PRENATAL (OUTPATIENT)
Dept: OBGYN CLINIC | Facility: CLINIC | Age: 33
End: 2018-08-16

## 2018-08-16 VITALS — BODY MASS INDEX: 26.47 KG/M2 | WEIGHT: 164 LBS | DIASTOLIC BLOOD PRESSURE: 64 MMHG | SYSTOLIC BLOOD PRESSURE: 120 MMHG

## 2018-08-16 DIAGNOSIS — Z34.81 PRENATAL CARE, SUBSEQUENT PREGNANCY, FIRST TRIMESTER: Primary | ICD-10-CM

## 2018-08-16 PROCEDURE — OBC: Performed by: OBSTETRICS & GYNECOLOGY

## 2018-08-16 NOTE — PROGRESS NOTES
Patient here today for routine prenatal intake    Obstetric history includes  x 2  No significant PMH  Previous children [place] out of state  Lives with spouse and 2 children ages 11 and 9  Has cat at home, advised not to change litter   at [facility], [place]  Patient has no pregnancy complaints at this time  Denies vaginal bleeding and pain  Will consider Sequential Screening but has not done it in the past  Routine prenatal labs ordered  PN1 scheduled   No questions at this time

## 2018-08-16 NOTE — PROGRESS NOTES
Nausea, fatigue, denies vaginal bleeding, denies pain  Children 5 and 7  Cat at home  Does not change litter  Considers sequenital screening

## 2018-08-27 ENCOUNTER — APPOINTMENT (OUTPATIENT)
Dept: LAB | Facility: HOSPITAL | Age: 33
End: 2018-08-27
Attending: OBSTETRICS & GYNECOLOGY

## 2018-08-27 DIAGNOSIS — Z34.81 PRENATAL CARE, SUBSEQUENT PREGNANCY, FIRST TRIMESTER: ICD-10-CM

## 2018-08-27 LAB
ABO GROUP BLD: NORMAL
BACTERIA UR QL AUTO: ABNORMAL /HPF
BASOPHILS # BLD AUTO: 0.04 THOUSANDS/ΜL (ref 0–0.1)
BASOPHILS NFR BLD AUTO: 1 % (ref 0–1)
BILIRUB UR QL STRIP: NEGATIVE
BLD GP AB SCN SERPL QL: NEGATIVE
CLARITY UR: ABNORMAL
COLOR UR: YELLOW
EOSINOPHIL # BLD AUTO: 0.16 THOUSAND/ΜL (ref 0–0.61)
EOSINOPHIL NFR BLD AUTO: 2 % (ref 0–6)
ERYTHROCYTE [DISTWIDTH] IN BLOOD BY AUTOMATED COUNT: 12.6 % (ref 11.6–15.1)
GLUCOSE UR STRIP-MCNC: NEGATIVE MG/DL
HBV SURFACE AG SER QL: NORMAL
HCT VFR BLD AUTO: 35 % (ref 34.8–46.1)
HGB BLD-MCNC: 11.8 G/DL (ref 11.5–15.4)
HGB UR QL STRIP.AUTO: NEGATIVE
HYALINE CASTS #/AREA URNS LPF: ABNORMAL /LPF
IMM GRANULOCYTES # BLD AUTO: 0.04 THOUSAND/UL (ref 0–0.2)
IMM GRANULOCYTES NFR BLD AUTO: 1 % (ref 0–2)
KETONES UR STRIP-MCNC: NEGATIVE MG/DL
LEUKOCYTE ESTERASE UR QL STRIP: NEGATIVE
LYMPHOCYTES # BLD AUTO: 1.51 THOUSANDS/ΜL (ref 0.6–4.47)
LYMPHOCYTES NFR BLD AUTO: 22 % (ref 14–44)
MCH RBC QN AUTO: 29.4 PG (ref 26.8–34.3)
MCHC RBC AUTO-ENTMCNC: 33.7 G/DL (ref 31.4–37.4)
MCV RBC AUTO: 87 FL (ref 82–98)
MONOCYTES # BLD AUTO: 0.57 THOUSAND/ΜL (ref 0.17–1.22)
MONOCYTES NFR BLD AUTO: 8 % (ref 4–12)
NEUTROPHILS # BLD AUTO: 4.54 THOUSANDS/ΜL (ref 1.85–7.62)
NEUTS SEG NFR BLD AUTO: 66 % (ref 43–75)
NITRITE UR QL STRIP: NEGATIVE
NON-SQ EPI CELLS URNS QL MICRO: ABNORMAL /HPF
NRBC BLD AUTO-RTO: 0 /100 WBCS
PH UR STRIP.AUTO: 6.5 [PH] (ref 4.5–8)
PLATELET # BLD AUTO: 233 THOUSANDS/UL (ref 149–390)
PMV BLD AUTO: 10.2 FL (ref 8.9–12.7)
PROT UR STRIP-MCNC: NEGATIVE MG/DL
RBC # BLD AUTO: 4.01 MILLION/UL (ref 3.81–5.12)
RBC #/AREA URNS AUTO: ABNORMAL /HPF
RH BLD: POSITIVE
RPR SER QL: NORMAL
RUBV IGG SERPL IA-ACNC: 106.2 IU/ML
SP GR UR STRIP.AUTO: 1.02 (ref 1–1.03)
SPECIMEN EXPIRATION DATE: NORMAL
UROBILINOGEN UR QL STRIP.AUTO: 0.2 E.U./DL
WBC # BLD AUTO: 6.86 THOUSAND/UL (ref 4.31–10.16)
WBC #/AREA URNS AUTO: ABNORMAL /HPF

## 2018-08-27 PROCEDURE — 36415 COLL VENOUS BLD VENIPUNCTURE: CPT

## 2018-08-27 PROCEDURE — 80081 OBSTETRIC PANEL INC HIV TSTG: CPT

## 2018-08-27 PROCEDURE — 87086 URINE CULTURE/COLONY COUNT: CPT

## 2018-08-27 PROCEDURE — 81001 URINALYSIS AUTO W/SCOPE: CPT

## 2018-08-29 LAB — BACTERIA UR CULT: NORMAL

## 2018-08-30 LAB — HIV 1+2 AB+HIV1 P24 AG SERPL QL IA: NORMAL

## 2018-08-31 ENCOUNTER — INITIAL PRENATAL (OUTPATIENT)
Dept: OBGYN CLINIC | Facility: CLINIC | Age: 33
End: 2018-08-31

## 2018-08-31 VITALS — SYSTOLIC BLOOD PRESSURE: 102 MMHG | WEIGHT: 165 LBS | BODY MASS INDEX: 26.63 KG/M2 | DIASTOLIC BLOOD PRESSURE: 56 MMHG

## 2018-08-31 DIAGNOSIS — Z34.91 ENCOUNTER FOR PREGNANCY RELATED EXAMINATION IN FIRST TRIMESTER: ICD-10-CM

## 2018-08-31 DIAGNOSIS — Z34.92 ENCOUNTER FOR SUPERVISION OF NORMAL PREGNANCY IN SECOND TRIMESTER, UNSPECIFIED GRAVIDITY: Primary | ICD-10-CM

## 2018-08-31 PROBLEM — N91.2 AMENORRHEA: Status: RESOLVED | Noted: 2018-08-02 | Resolved: 2018-08-31

## 2018-08-31 PROCEDURE — PNV: Performed by: NURSE PRACTITIONER

## 2018-08-31 PROCEDURE — 87491 CHLMYD TRACH DNA AMP PROBE: CPT | Performed by: NURSE PRACTITIONER

## 2018-08-31 PROCEDURE — 87591 N.GONORRHOEAE DNA AMP PROB: CPT | Performed by: NURSE PRACTITIONER

## 2018-08-31 NOTE — ASSESSMENT & PLAN NOTE
PN1  Planned pregnancy    #1: () term , uncomplicated prenatal couse  #2: () same  PMHx noncontributory  The patient denies complaints  Denies pelvic pain, bleeding, LOF  Exam WNL   by Doppler  Prenatal labs WNL  Rh pos  Pap up to date and gc/chl sent today  Reviewed options for low risk aneuploidy screening  Discussed risks/benefits/limitations  The patient reports she declines SS; encouraged to call [facility] and schedule L2  Reviewed diet and activity recommendations, practice set up, visit intervals and reasons to call  RTO in 4 weeks

## 2018-08-31 NOTE — PROGRESS NOTES
Problem List Items Addressed This Visit     Supervision of normal pregnancy in second trimester - Primary     PN1  Planned pregnancy    #1: () term , uncomplicated prenatal couse  #2: () same  PMHx noncontributory  The patient denies complaints  Denies pelvic pain, bleeding, LOF  Exam WNL   by Doppler  Prenatal labs WNL  Rh pos  Pap up to date and gc/chl sent today  Reviewed options for low risk aneuploidy screening  Discussed risks/benefits/limitations  The patient reports she declines SS; encouraged to call [facility] and schedule L2  Reviewed diet and activity recommendations, practice set up, visit intervals and reasons to call  RTO in 4 weeks              Relevant Orders    Chlamydia/GC amplified DNA by PCR      Other Visit Diagnoses     Encounter for pregnancy related examination in first trimester

## 2018-09-04 LAB
CHLAMYDIA DNA CVX QL NAA+PROBE: NORMAL
N GONORRHOEA DNA GENITAL QL NAA+PROBE: NORMAL

## 2018-09-26 ENCOUNTER — TELEPHONE (OUTPATIENT)
Dept: OBGYN CLINIC | Facility: CLINIC | Age: 33
End: 2018-09-26

## 2018-09-27 ENCOUNTER — ROUTINE PRENATAL (OUTPATIENT)
Dept: OBGYN CLINIC | Facility: CLINIC | Age: 33
End: 2018-09-27
Payer: COMMERCIAL

## 2018-09-27 ENCOUNTER — TELEPHONE (OUTPATIENT)
Dept: OBGYN CLINIC | Facility: CLINIC | Age: 33
End: 2018-09-27

## 2018-09-27 VITALS — DIASTOLIC BLOOD PRESSURE: 54 MMHG | WEIGHT: 168.4 LBS | BODY MASS INDEX: 27.18 KG/M2 | SYSTOLIC BLOOD PRESSURE: 104 MMHG

## 2018-09-27 DIAGNOSIS — Z23 NEED FOR INFLUENZA VACCINATION: Primary | ICD-10-CM

## 2018-09-27 DIAGNOSIS — Z34.82 ENCOUNTER FOR SUPERVISION OF NORMAL PREGNANCY IN MULTIGRAVIDA IN SECOND TRIMESTER: ICD-10-CM

## 2018-09-27 PROCEDURE — PNV: Performed by: PHYSICIAN ASSISTANT

## 2018-09-27 PROCEDURE — 90471 IMMUNIZATION ADMIN: CPT

## 2018-09-27 PROCEDURE — 90686 IIV4 VACC NO PRSV 0.5 ML IM: CPT

## 2018-09-28 PROBLEM — Z34.82 ENCOUNTER FOR SUPERVISION OF NORMAL PREGNANCY IN MULTIGRAVIDA IN SECOND TRIMESTER: Status: ACTIVE | Noted: 2018-08-31

## 2018-09-29 NOTE — PROGRESS NOTES
Patient w/o complaints  No fetal movement yet, reassured pt can take up to 24 wks for fetal movement  Level 2 scheduled      Problem List Items Addressed This Visit     Encounter for supervision of normal pregnancy in multigravida in second trimester     RTO 4 wks  Flu shot given today  For level 2  Reviewed reasons to call           Other Visit Diagnoses     Need for influenza vaccination    -  Primary    Relevant Orders    influenza vaccine, [id number], quadrivalent, 0 5 mL, preservative-free (SYRINGE, SINGLE-DOSE VIAL), for adult and pediatric patients 3 yr+ (AFLURIA, FLUARIX, FLULAVAL, FLUZONE) (Completed)

## 2018-10-15 ENCOUNTER — ROUTINE PRENATAL (OUTPATIENT)
Dept: PERINATAL CARE | Facility: CLINIC | Age: 33
End: 2018-10-15
Payer: COMMERCIAL

## 2018-10-15 VITALS
HEART RATE: 88 BPM | SYSTOLIC BLOOD PRESSURE: 109 MMHG | DIASTOLIC BLOOD PRESSURE: 74 MMHG | HEIGHT: 66 IN | WEIGHT: 172 LBS | BODY MASS INDEX: 27.64 KG/M2

## 2018-10-15 DIAGNOSIS — Z34.92 PRENATAL CARE IN SECOND TRIMESTER: ICD-10-CM

## 2018-10-15 DIAGNOSIS — Z3A.20 20 WEEKS GESTATION OF PREGNANCY: ICD-10-CM

## 2018-10-15 DIAGNOSIS — Z36.86 ENCOUNTER FOR ANTENATAL SCREENING FOR CERVICAL LENGTH: ICD-10-CM

## 2018-10-15 DIAGNOSIS — Z34.82 ENCOUNTER FOR SUPERVISION OF NORMAL PREGNANCY IN MULTIGRAVIDA IN SECOND TRIMESTER: ICD-10-CM

## 2018-10-15 DIAGNOSIS — Z36.3 ENCOUNTER FOR ANTENATAL SCREENING FOR MALFORMATION: Primary | ICD-10-CM

## 2018-10-15 PROCEDURE — 76805 OB US >/= 14 WKS SNGL FETUS: CPT | Performed by: OBSTETRICS & GYNECOLOGY

## 2018-10-15 PROCEDURE — 76817 TRANSVAGINAL US OBSTETRIC: CPT | Performed by: OBSTETRICS & GYNECOLOGY

## 2018-10-15 NOTE — PROGRESS NOTES
A transvaginal ultrasound was performed  Sonographer note on use of High Level Disinfection Process (Trophon) for transvaginal probe# 5 used, serial [id number]    [facility]

## 2018-10-15 NOTE — PROGRESS NOTES
Previously declined genetic screening  US limited in the area of the cardiac septum, Will plan a 28 week US for growth and missed anatomy  She is interested in the quad screen  Ordered today       Vivian Velazquez MD

## 2018-10-16 ENCOUNTER — APPOINTMENT (OUTPATIENT)
Dept: LAB | Facility: HOSPITAL | Age: 33
End: 2018-10-16
Attending: OBSTETRICS & GYNECOLOGY
Payer: COMMERCIAL

## 2018-10-16 ENCOUNTER — TRANSCRIBE ORDERS (OUTPATIENT)
Dept: LAB | Facility: HOSPITAL | Age: 33
End: 2018-10-16

## 2018-10-16 PROCEDURE — 82105 ALPHA-FETOPROTEIN SERUM: CPT | Performed by: OBSTETRICS & GYNECOLOGY

## 2018-10-16 PROCEDURE — 84702 CHORIONIC GONADOTROPIN TEST: CPT | Performed by: OBSTETRICS & GYNECOLOGY

## 2018-10-16 PROCEDURE — 82677 ASSAY OF ESTRIOL: CPT | Performed by: OBSTETRICS & GYNECOLOGY

## 2018-10-16 PROCEDURE — 36415 COLL VENOUS BLD VENIPUNCTURE: CPT | Performed by: OBSTETRICS & GYNECOLOGY

## 2018-10-16 PROCEDURE — 86336 INHIBIN A: CPT | Performed by: OBSTETRICS & GYNECOLOGY

## 2018-10-18 LAB
2ND TRIMESTER 4 SCREEN SERPL-IMP: NORMAL
2ND TRIMESTER 4 SCREEN SERPL-IMP: NORMAL
AFP ADJ MOM SERPL: 1.28
AFP SERPL-MCNC: 59.6 NG/ML
AGE AT DELIVERY: 32.6 YR
FET TS 18 RISK FROM MAT AGE: NORMAL
FET TS 21 RISK FROM MAT AGE: 477
GA METHOD: NORMAL
GA: 19.3 WEEKS
HCG ADJ MOM SERPL: 1.54
HCG SERPL-ACNC: NORMAL MIU/ML
IDDM PATIENT QL: NO
INHIBIN A ADJ MOM SERPL: 0.96
INHIBIN A SERPL-MCNC: 161.01 PG/ML
KARYOTYP BLD/T: NORMAL
MULTIPLE PREGNANCY: NO
NEURAL TUBE DEFECT RISK FETUS: 5096 %
SERVICE CMNT-IMP: NORMAL
TS 18 RISK FETUS: NORMAL
TS 21 RISK FETUS: 7246
U ESTRIOL ADJ MOM SERPL: 1.13
U ESTRIOL SERPL-MCNC: 1.74 NG/ML

## 2018-10-30 ENCOUNTER — ROUTINE PRENATAL (OUTPATIENT)
Dept: OBGYN CLINIC | Facility: CLINIC | Age: 33
End: 2018-10-30

## 2018-10-30 VITALS — DIASTOLIC BLOOD PRESSURE: 50 MMHG | BODY MASS INDEX: 28.25 KG/M2 | WEIGHT: 175 LBS | SYSTOLIC BLOOD PRESSURE: 120 MMHG

## 2018-10-30 DIAGNOSIS — Z34.92 PRENATAL CARE IN SECOND TRIMESTER: ICD-10-CM

## 2018-10-30 DIAGNOSIS — Z34.82 ENCOUNTER FOR SUPERVISION OF NORMAL PREGNANCY IN MULTIGRAVIDA IN SECOND TRIMESTER: Primary | ICD-10-CM

## 2018-10-30 PROCEDURE — PNV: Performed by: OBSTETRICS & GYNECOLOGY

## 2018-10-30 NOTE — PROGRESS NOTES
Celester Karel is doing well  Had Quad screen - WNL  Level II with some missed views, will have follow up 28 weeks  It's another boy! Daughter is adjusting to this - she was hoping for sister

## 2018-11-28 ENCOUNTER — ROUTINE PRENATAL (OUTPATIENT)
Dept: OBGYN CLINIC | Facility: CLINIC | Age: 33
End: 2018-11-28

## 2018-11-28 VITALS — WEIGHT: 179 LBS | SYSTOLIC BLOOD PRESSURE: 100 MMHG | DIASTOLIC BLOOD PRESSURE: 50 MMHG | BODY MASS INDEX: 28.89 KG/M2

## 2018-11-28 DIAGNOSIS — Z34.82 ENCOUNTER FOR SUPERVISION OF NORMAL PREGNANCY IN MULTIGRAVIDA IN SECOND TRIMESTER: Primary | ICD-10-CM

## 2018-11-28 PROBLEM — Z3A.26 26 WEEKS GESTATION OF PREGNANCY: Status: ACTIVE | Noted: 2018-10-15

## 2018-11-28 PROCEDURE — PNV: Performed by: OBSTETRICS & GYNECOLOGY

## 2018-12-08 ENCOUNTER — APPOINTMENT (OUTPATIENT)
Dept: LAB | Facility: HOSPITAL | Age: 33
End: 2018-12-08
Attending: OBSTETRICS & GYNECOLOGY
Payer: COMMERCIAL

## 2018-12-08 DIAGNOSIS — Z34.92 PRENATAL CARE IN SECOND TRIMESTER: ICD-10-CM

## 2018-12-08 LAB
BASOPHILS # BLD AUTO: 0.04 THOUSANDS/ΜL (ref 0–0.1)
BASOPHILS NFR BLD AUTO: 1 % (ref 0–1)
EOSINOPHIL # BLD AUTO: 0.12 THOUSAND/ΜL (ref 0–0.61)
EOSINOPHIL NFR BLD AUTO: 2 % (ref 0–6)
ERYTHROCYTE [DISTWIDTH] IN BLOOD BY AUTOMATED COUNT: 12.8 % (ref 11.6–15.1)
GLUCOSE 1H P 50 G GLC PO SERPL-MCNC: 78 MG/DL
HCT VFR BLD AUTO: 33.2 % (ref 34.8–46.1)
HGB BLD-MCNC: 10.7 G/DL (ref 11.5–15.4)
IMM GRANULOCYTES # BLD AUTO: 0.03 THOUSAND/UL (ref 0–0.2)
IMM GRANULOCYTES NFR BLD AUTO: 0 % (ref 0–2)
LYMPHOCYTES # BLD AUTO: 1.54 THOUSANDS/ΜL (ref 0.6–4.47)
LYMPHOCYTES NFR BLD AUTO: 22 % (ref 14–44)
MCH RBC QN AUTO: 27.5 PG (ref 26.8–34.3)
MCHC RBC AUTO-ENTMCNC: 32.2 G/DL (ref 31.4–37.4)
MCV RBC AUTO: 85 FL (ref 82–98)
MONOCYTES # BLD AUTO: 0.53 THOUSAND/ΜL (ref 0.17–1.22)
MONOCYTES NFR BLD AUTO: 8 % (ref 4–12)
NEUTROPHILS # BLD AUTO: 4.76 THOUSANDS/ΜL (ref 1.85–7.62)
NEUTS SEG NFR BLD AUTO: 67 % (ref 43–75)
NRBC BLD AUTO-RTO: 0 /100 WBCS
PLATELET # BLD AUTO: 253 THOUSANDS/UL (ref 149–390)
PMV BLD AUTO: 9.6 FL (ref 8.9–12.7)
RBC # BLD AUTO: 3.89 MILLION/UL (ref 3.81–5.12)
WBC # BLD AUTO: 7.02 THOUSAND/UL (ref 4.31–10.16)

## 2018-12-08 PROCEDURE — 82950 GLUCOSE TEST: CPT

## 2018-12-08 PROCEDURE — 86592 SYPHILIS TEST NON-TREP QUAL: CPT

## 2018-12-08 PROCEDURE — 36415 COLL VENOUS BLD VENIPUNCTURE: CPT

## 2018-12-08 PROCEDURE — 85025 COMPLETE CBC W/AUTO DIFF WBC: CPT

## 2018-12-10 LAB — RPR SER QL: NORMAL

## 2018-12-12 ENCOUNTER — ULTRASOUND (OUTPATIENT)
Dept: PERINATAL CARE | Facility: CLINIC | Age: 33
End: 2018-12-12
Payer: COMMERCIAL

## 2018-12-12 VITALS
WEIGHT: 179.4 LBS | DIASTOLIC BLOOD PRESSURE: 58 MMHG | BODY MASS INDEX: 35.22 KG/M2 | HEIGHT: 60 IN | SYSTOLIC BLOOD PRESSURE: 127 MMHG | HEART RATE: 61 BPM

## 2018-12-12 DIAGNOSIS — Z3A.28 28 WEEKS GESTATION OF PREGNANCY: Primary | ICD-10-CM

## 2018-12-12 DIAGNOSIS — Z34.82 ENCOUNTER FOR SUPERVISION OF NORMAL PREGNANCY IN MULTIGRAVIDA IN SECOND TRIMESTER: ICD-10-CM

## 2018-12-12 PROCEDURE — 76816 OB US FOLLOW-UP PER FETUS: CPT | Performed by: OBSTETRICS & GYNECOLOGY

## 2018-12-12 NOTE — PROGRESS NOTES
Ms Sukhjinder Morris  is a 30 yo  at 29 3/7   weeks gestation who presents for a fetal ultrasound  examination to evaluate growth and completion of fetal anatomy  She is asymptomatic  See Ob procedures  Ultrasound:  1  Live fetus in vertex   presentation  2    Fetal size = dates  3   No fetal anomalies observed  4  Normal AMA  5  Placenta is posterior   and not a placenta previa  I reviewed the results of this ultrasound and answered  all the questions  Recommendations:  1  Follow-up as clinically indicated       Sukumar Spaulding MD

## 2018-12-20 ENCOUNTER — ROUTINE PRENATAL (OUTPATIENT)
Dept: OBGYN CLINIC | Facility: CLINIC | Age: 33
End: 2018-12-20

## 2018-12-20 VITALS — SYSTOLIC BLOOD PRESSURE: 110 MMHG | WEIGHT: 181 LBS | BODY MASS INDEX: 35.35 KG/M2 | DIASTOLIC BLOOD PRESSURE: 60 MMHG

## 2018-12-20 DIAGNOSIS — Z3A.29 29 WEEKS GESTATION OF PREGNANCY: ICD-10-CM

## 2018-12-20 DIAGNOSIS — O99.019 ANEMIA DURING PREGNANCY: ICD-10-CM

## 2018-12-20 DIAGNOSIS — Z34.93 ENCOUNTER FOR SUPERVISION OF NORMAL PREGNANCY IN THIRD TRIMESTER, UNSPECIFIED GRAVIDITY: Primary | ICD-10-CM

## 2018-12-20 PROCEDURE — PNV: Performed by: NURSE PRACTITIONER

## 2018-12-20 NOTE — ASSESSMENT & PLAN NOTE
Denies OB complaints  Good fetal movement  Denies contractions, cramping, leakage of fluid or vaginal bleeding  S/p flu vaccine  Pt wishes to defer tdap to next visit  Baby and Me considerations reinforced  Reviewed  labor precautions and FKCs  Planning to fly for a trip in January - reviewed DVT precautions, dehydration precautions  Note provided

## 2018-12-20 NOTE — PROGRESS NOTES
Problem List Items Addressed This Visit     Supervision of normal pregnancy in third trimester - Primary     Denies OB complaints  Good fetal movement  Denies contractions, cramping, leakage of fluid or vaginal bleeding  S/p flu vaccine  Pt wishes to defer tdap to next visit  Baby and Me considerations reinforced  Reviewed  labor precautions and FKCs  Planning to fly for a trip in January - reviewed DVT precautions, dehydration precautions  Note provided  29 weeks gestation of pregnancy    Anemia during pregnancy     28 wk hgb 10 7  Recommended Fe supplement and increased intake in Fe rich foods

## 2018-12-27 ENCOUNTER — TELEPHONE (OUTPATIENT)
Dept: OBGYN CLINIC | Facility: CLINIC | Age: 33
End: 2018-12-27

## 2018-12-27 NOTE — TELEPHONE ENCOUNTER
----- Message from Ishan Ballesteros sent at 2018  7:28 AM EST -----  Regarding: Non-Urgent Medical Question  Contact: [phone]  Good morning Pattie Andujar,     I tried to schedule a pre-dre massage and the therapist is requesting a doctors note  Is that something that you could send to My Chart? Thanks!      Ishan Ballesteros

## 2019-01-03 ENCOUNTER — ROUTINE PRENATAL (OUTPATIENT)
Dept: OBGYN CLINIC | Facility: CLINIC | Age: 34
End: 2019-01-03
Payer: COMMERCIAL

## 2019-01-03 VITALS — SYSTOLIC BLOOD PRESSURE: 98 MMHG | BODY MASS INDEX: 36.33 KG/M2 | DIASTOLIC BLOOD PRESSURE: 68 MMHG | WEIGHT: 186 LBS

## 2019-01-03 DIAGNOSIS — Z34.83 ENCOUNTER FOR SUPERVISION OF OTHER NORMAL PREGNANCY IN THIRD TRIMESTER: Primary | ICD-10-CM

## 2019-01-03 DIAGNOSIS — Z23 NEED FOR TDAP VACCINATION: ICD-10-CM

## 2019-01-03 PROBLEM — Z3A.31 31 WEEKS GESTATION OF PREGNANCY: Status: ACTIVE | Noted: 2018-10-15

## 2019-01-03 PROCEDURE — 90715 TDAP VACCINE 7 YRS/> IM: CPT

## 2019-01-03 PROCEDURE — PNV: Performed by: OBSTETRICS & GYNECOLOGY

## 2019-01-03 PROCEDURE — 90471 IMMUNIZATION ADMIN: CPT

## 2019-01-16 ENCOUNTER — ROUTINE PRENATAL (OUTPATIENT)
Dept: OBGYN CLINIC | Facility: CLINIC | Age: 34
End: 2019-01-16

## 2019-01-16 VITALS — BODY MASS INDEX: 36.99 KG/M2 | WEIGHT: 189.4 LBS | DIASTOLIC BLOOD PRESSURE: 58 MMHG | SYSTOLIC BLOOD PRESSURE: 98 MMHG

## 2019-01-16 DIAGNOSIS — Z3A.33 33 WEEKS GESTATION OF PREGNANCY: Primary | ICD-10-CM

## 2019-01-16 PROCEDURE — PNV: Performed by: OBSTETRICS & GYNECOLOGY

## 2019-01-16 NOTE — PROGRESS NOTES
S/p flu and Tdap  O pos, labs done, she has check in card and breast pump order  Patient feels good

## 2019-01-16 NOTE — PROGRESS NOTES
Problem List Items Addressed This Visit        Other    33 weeks gestation of pregnancy - Primary     Patient feels well without complaint  Labor precautions reviewed

## 2019-01-31 ENCOUNTER — ROUTINE PRENATAL (OUTPATIENT)
Dept: OBGYN CLINIC | Facility: CLINIC | Age: 34
End: 2019-01-31

## 2019-01-31 VITALS — SYSTOLIC BLOOD PRESSURE: 120 MMHG | WEIGHT: 189 LBS | BODY MASS INDEX: 36.91 KG/M2 | DIASTOLIC BLOOD PRESSURE: 70 MMHG

## 2019-01-31 DIAGNOSIS — Z34.83 ENCOUNTER FOR SUPERVISION OF OTHER NORMAL PREGNANCY IN THIRD TRIMESTER: Primary | ICD-10-CM

## 2019-01-31 PROCEDURE — PNV: Performed by: OBSTETRICS & GYNECOLOGY

## 2019-01-31 NOTE — PROGRESS NOTES
GBS next visit- PCN allergy  Last few weeks of pregnancy paper given  Perineal massage paper given  Baby moving well

## 2019-01-31 NOTE — PROGRESS NOTES
Michelle Marcum is doing well  Overall feeling well  No contractions, LOF, bleeding  Baby is active  Some rib numbness - but had with other pregnancies

## 2019-02-08 ENCOUNTER — ROUTINE PRENATAL (OUTPATIENT)
Dept: OBGYN CLINIC | Facility: CLINIC | Age: 34
End: 2019-02-08

## 2019-02-08 VITALS — WEIGHT: 190.8 LBS | SYSTOLIC BLOOD PRESSURE: 110 MMHG | BODY MASS INDEX: 37.26 KG/M2 | DIASTOLIC BLOOD PRESSURE: 68 MMHG

## 2019-02-08 DIAGNOSIS — Z34.93 THIRD TRIMESTER PREGNANCY: ICD-10-CM

## 2019-02-08 DIAGNOSIS — Z3A.36 36 WEEKS GESTATION OF PREGNANCY: ICD-10-CM

## 2019-02-08 DIAGNOSIS — Z34.83 ENCOUNTER FOR SUPERVISION OF OTHER NORMAL PREGNANCY IN THIRD TRIMESTER: Primary | ICD-10-CM

## 2019-02-08 PROCEDURE — 87653 STREP B DNA AMP PROBE: CPT | Performed by: NURSE PRACTITIONER

## 2019-02-08 PROCEDURE — PNV: Performed by: NURSE PRACTITIONER

## 2019-02-08 NOTE — PROGRESS NOTES
Problem List Items Addressed This Visit     Supervision of normal pregnancy in third trimester - Primary     Denies OB complaints  Good fetal movement  Denies contractions, cramping, leakage of fluid or vaginal bleeding  GBS collected today  S/p flu and Tdap vaccine  Reviewed labor precautions, s/s to report, and FKCs              36 weeks gestation of pregnancy      Other Visit Diagnoses     Third trimester pregnancy        Relevant Orders    Strep B DNA probe, amplification

## 2019-02-08 NOTE — ASSESSMENT & PLAN NOTE
Denies OB complaints  Good fetal movement  Denies contractions, cramping, leakage of fluid or vaginal bleeding  GBS collected today  S/p flu and Tdap vaccine  Reviewed labor precautions, s/s to report, and FKCs

## 2019-02-10 LAB — GP B STREP DNA SPEC QL NAA+PROBE: NORMAL

## 2019-02-15 ENCOUNTER — ROUTINE PRENATAL (OUTPATIENT)
Dept: OBGYN CLINIC | Facility: CLINIC | Age: 34
End: 2019-02-15

## 2019-02-15 VITALS — BODY MASS INDEX: 37.65 KG/M2 | DIASTOLIC BLOOD PRESSURE: 68 MMHG | WEIGHT: 192.8 LBS | SYSTOLIC BLOOD PRESSURE: 108 MMHG

## 2019-02-15 DIAGNOSIS — Z34.83 ENCOUNTER FOR SUPERVISION OF OTHER NORMAL PREGNANCY IN THIRD TRIMESTER: ICD-10-CM

## 2019-02-15 DIAGNOSIS — Z3A.37 37 WEEKS GESTATION OF PREGNANCY: Primary | ICD-10-CM

## 2019-02-15 PROCEDURE — PNV: Performed by: NURSE PRACTITIONER

## 2019-02-15 NOTE — PROGRESS NOTES
Problem List Items Addressed This Visit        Other    Supervision of normal pregnancy in third trimester     Denies OB complaints  Good fetal movement  Experienced ctx a few days ago, but once she laid down they stopped  Denies leakage of fluid or vaginal bleeding  GBS negative  Reviewed labor precautions and FKCs  RTO one week             37 weeks gestation of pregnancy - Primary

## 2019-02-15 NOTE — ASSESSMENT & PLAN NOTE
Denies OB complaints  Good fetal movement  Experienced ctx a few days ago, but once she laid down they stopped  Denies leakage of fluid or vaginal bleeding  GBS negative  Reviewed labor precautions and FKCs  RTO one week

## 2019-02-21 ENCOUNTER — ROUTINE PRENATAL (OUTPATIENT)
Dept: OBGYN CLINIC | Facility: CLINIC | Age: 34
End: 2019-02-21

## 2019-02-21 VITALS — DIASTOLIC BLOOD PRESSURE: 76 MMHG | WEIGHT: 195 LBS | BODY MASS INDEX: 38.08 KG/M2 | SYSTOLIC BLOOD PRESSURE: 134 MMHG

## 2019-02-21 DIAGNOSIS — Z34.83 ENCOUNTER FOR SUPERVISION OF OTHER NORMAL PREGNANCY IN THIRD TRIMESTER: Primary | ICD-10-CM

## 2019-02-21 DIAGNOSIS — O99.019 ANEMIA DURING PREGNANCY: ICD-10-CM

## 2019-02-21 DIAGNOSIS — Z3A.38 38 WEEKS GESTATION OF PREGNANCY: ICD-10-CM

## 2019-02-21 PROCEDURE — PNV: Performed by: NURSE PRACTITIONER

## 2019-02-21 NOTE — PROGRESS NOTES
Problem List Items Addressed This Visit        Other    Supervision of normal pregnancy in third trimester - Primary     Denies OB complaints  Feeling well  Good fetal movement  Denies contractions, cramping, leakage of fluid or vaginal bleeding  Patient's  is away for work in [place]  Returns home 2/28  Reviewed labor precautions, signs and symptoms to report, and FKCs  RTO in one week             38 weeks gestation of pregnancy    Anemia during pregnancy

## 2019-02-21 NOTE — ASSESSMENT & PLAN NOTE
Denies OB complaints  Feeling well  Good fetal movement  Denies contractions, cramping, leakage of fluid or vaginal bleeding  Patient's  is away for work in [place]  Returns home 2/28  Reviewed labor precautions, signs and symptoms to report, and FKCs  RTO in one week

## 2019-02-27 ENCOUNTER — ROUTINE PRENATAL (OUTPATIENT)
Dept: OBGYN CLINIC | Facility: CLINIC | Age: 34
End: 2019-02-27

## 2019-02-27 VITALS — WEIGHT: 198 LBS | SYSTOLIC BLOOD PRESSURE: 100 MMHG | BODY MASS INDEX: 38.67 KG/M2 | DIASTOLIC BLOOD PRESSURE: 72 MMHG

## 2019-02-27 DIAGNOSIS — Z34.83 ENCOUNTER FOR SUPERVISION OF OTHER NORMAL PREGNANCY IN THIRD TRIMESTER: Primary | ICD-10-CM

## 2019-02-27 PROCEDURE — PNV: Performed by: OBSTETRICS & GYNECOLOGY

## 2019-02-27 NOTE — PROGRESS NOTES
Kemal Bhatti is doing well  Labor precautions reviewed  Has appointment for next week if she does not go into labor

## 2019-03-03 ENCOUNTER — ANESTHESIA EVENT (INPATIENT)
Dept: LABOR AND DELIVERY | Facility: HOSPITAL | Age: 34
End: 2019-03-03
Payer: COMMERCIAL

## 2019-03-03 ENCOUNTER — ANESTHESIA (INPATIENT)
Dept: LABOR AND DELIVERY | Facility: HOSPITAL | Age: 34
End: 2019-03-03
Payer: COMMERCIAL

## 2019-03-03 ENCOUNTER — HOSPITAL ENCOUNTER (INPATIENT)
Facility: HOSPITAL | Age: 34
LOS: 3 days | Discharge: HOME | End: 2019-03-06
Attending: OBSTETRICS & GYNECOLOGY | Admitting: OBSTETRICS & GYNECOLOGY
Payer: COMMERCIAL

## 2019-03-03 DIAGNOSIS — Z3A.38 38 WEEKS GESTATION OF PREGNANCY: Primary | ICD-10-CM

## 2019-03-03 PROBLEM — Z3A.40 40 WEEKS GESTATION OF PREGNANCY: Status: ACTIVE | Noted: 2019-03-03

## 2019-03-03 LAB
ABO GROUP BLD: NORMAL
BLD GP AB SCN SERPL QL: NEGATIVE
ERYTHROCYTE [DISTWIDTH] IN BLOOD BY AUTOMATED COUNT: 13.9 % (ref 11.6–15.1)
HCT VFR BLD AUTO: 31 % (ref 34.8–46.1)
HGB BLD-MCNC: 9.9 G/DL (ref 11.5–15.4)
MCH RBC QN AUTO: 26.3 PG (ref 26.8–34.3)
MCHC RBC AUTO-ENTMCNC: 31.9 G/DL (ref 31.4–37.4)
MCV RBC AUTO: 82 FL (ref 82–98)
PLATELET # BLD AUTO: 243 THOUSANDS/UL (ref 149–390)
PMV BLD AUTO: 10.9 FL (ref 8.9–12.7)
RBC # BLD AUTO: 3.77 MILLION/UL (ref 3.81–5.12)
RH BLD: POSITIVE
SPECIMEN EXPIRATION DATE: NORMAL
WBC # BLD AUTO: 11.34 THOUSAND/UL (ref 4.31–10.16)

## 2019-03-03 PROCEDURE — 86850 RBC ANTIBODY SCREEN: CPT | Performed by: OBSTETRICS & GYNECOLOGY

## 2019-03-03 PROCEDURE — 86900 BLOOD TYPING SEROLOGIC ABO: CPT | Performed by: OBSTETRICS & GYNECOLOGY

## 2019-03-03 PROCEDURE — 85027 COMPLETE CBC AUTOMATED: CPT | Performed by: OBSTETRICS & GYNECOLOGY

## 2019-03-03 PROCEDURE — 86592 SYPHILIS TEST NON-TREP QUAL: CPT | Performed by: OBSTETRICS & GYNECOLOGY

## 2019-03-03 PROCEDURE — 86901 BLOOD TYPING SEROLOGIC RH(D): CPT | Performed by: OBSTETRICS & GYNECOLOGY

## 2019-03-03 PROCEDURE — 99214 OFFICE O/P EST MOD 30 MIN: CPT

## 2019-03-03 RX ORDER — SODIUM CHLORIDE, SODIUM LACTATE, POTASSIUM CHLORIDE, CALCIUM CHLORIDE 600; 310; 30; 20 MG/100ML; MG/100ML; MG/100ML; MG/100ML
125 INJECTION, SOLUTION INTRAVENOUS CONTINUOUS
Status: DISCONTINUED | OUTPATIENT
Start: 2019-03-03 | End: 2019-03-04

## 2019-03-03 RX ORDER — LIDOCAINE HYDROCHLORIDE AND EPINEPHRINE 20; 5 MG/ML; UG/ML
INJECTION, SOLUTION EPIDURAL; INFILTRATION; INTRACAUDAL; PERINEURAL AS NEEDED
Status: DISCONTINUED | OUTPATIENT
Start: 2019-03-03 | End: 2019-03-04 | Stop reason: SURG

## 2019-03-03 RX ORDER — ROPIVACAINE HYDROCHLORIDE 2 MG/ML
INJECTION, SOLUTION EPIDURAL; INFILTRATION; PERINEURAL AS NEEDED
Status: DISCONTINUED | OUTPATIENT
Start: 2019-03-03 | End: 2019-03-04 | Stop reason: SURG

## 2019-03-03 RX ADMIN — SODIUM CHLORIDE, SODIUM LACTATE, POTASSIUM CHLORIDE, AND CALCIUM CHLORIDE 125 ML/HR: .6; .31; .03; .02 INJECTION, SOLUTION INTRAVENOUS at 23:16

## 2019-03-03 RX ADMIN — ROPIVACAINE HYDROCHLORIDE: 2 INJECTION, SOLUTION EPIDURAL; INFILTRATION at 23:15

## 2019-03-03 RX ADMIN — ROPIVACAINE HYDROCHLORIDE 3 ML: 2 INJECTION, SOLUTION EPIDURAL; INFILTRATION at 23:19

## 2019-03-03 RX ADMIN — SODIUM CHLORIDE, SODIUM LACTATE, POTASSIUM CHLORIDE, AND CALCIUM CHLORIDE 999 ML/HR: .6; .31; .03; .02 INJECTION, SOLUTION INTRAVENOUS at 22:00

## 2019-03-03 RX ADMIN — ROPIVACAINE HYDROCHLORIDE 3 ML: 2 INJECTION, SOLUTION EPIDURAL; INFILTRATION at 23:23

## 2019-03-03 RX ADMIN — LIDOCAINE HYDROCHLORIDE AND EPINEPHRINE 5 ML: 20; 5 INJECTION, SOLUTION EPIDURAL; INFILTRATION; INTRACAUDAL; PERINEURAL at 23:15

## 2019-03-04 LAB
BASE EXCESS BLDCOA CALC-SCNC: -11 MMOL/L (ref 3–11)
BASE EXCESS BLDCOV CALC-SCNC: -8 MMOL/L (ref 1–9)
HCO3 BLDCOA-SCNC: 18.8 MMOL/L (ref 17.3–27.3)
HCO3 BLDCOV-SCNC: 18.6 MMOL/L (ref 12.2–28.6)
O2 CT VFR BLDCOA CALC: 4.7 ML/DL
OXYHGB MFR BLDCOA: 22.3 %
OXYHGB MFR BLDCOV: 54.4 %
PCO2 BLDCOA: 58.5 MM[HG] (ref 30–60)
PCO2 BLDCOV: 41.6 MM HG (ref 27–43)
PH BLDCOA: 7.13 [PH] (ref 7.23–7.43)
PH BLDCOV: 7.27 [PH] (ref 7.19–7.49)
PO2 BLDCOA: 16.8 MM HG (ref 5–25)
PO2 BLDCOV: 26.7 MM HG (ref 15–45)
RPR SER QL: NORMAL
SAO2 % BLDCOV: 11.9 ML/DL

## 2019-03-04 PROCEDURE — 0KQM0ZZ REPAIR PERINEUM MUSCLE, OPEN APPROACH: ICD-10-PCS | Performed by: OBSTETRICS & GYNECOLOGY

## 2019-03-04 PROCEDURE — 10907ZC DRAINAGE OF AMNIOTIC FLUID, THERAPEUTIC FROM PRODUCTS OF CONCEPTION, VIA NATURAL OR ARTIFICIAL OPENING: ICD-10-PCS | Performed by: OBSTETRICS & GYNECOLOGY

## 2019-03-04 PROCEDURE — 59400 OBSTETRICAL CARE: CPT | Performed by: OBSTETRICS & GYNECOLOGY

## 2019-03-04 PROCEDURE — 82805 BLOOD GASES W/O2 SATURATION: CPT | Performed by: OBSTETRICS & GYNECOLOGY

## 2019-03-04 RX ORDER — ACETAMINOPHEN 325 MG/1
650 TABLET ORAL EVERY 6 HOURS PRN
Status: DISCONTINUED | OUTPATIENT
Start: 2019-03-04 | End: 2019-03-06 | Stop reason: HOSPADM

## 2019-03-04 RX ORDER — OXYCODONE HYDROCHLORIDE AND ACETAMINOPHEN 5; 325 MG/1; MG/1
1 TABLET ORAL EVERY 4 HOURS PRN
Status: DISCONTINUED | OUTPATIENT
Start: 2019-03-04 | End: 2019-03-06 | Stop reason: HOSPADM

## 2019-03-04 RX ORDER — DOCUSATE SODIUM 100 MG/1
100 CAPSULE, LIQUID FILLED ORAL 2 TIMES DAILY
Status: DISCONTINUED | OUTPATIENT
Start: 2019-03-04 | End: 2019-03-06 | Stop reason: HOSPADM

## 2019-03-04 RX ORDER — IBUPROFEN 600 MG/1
600 TABLET ORAL EVERY 6 HOURS PRN
Status: DISCONTINUED | OUTPATIENT
Start: 2019-03-04 | End: 2019-03-06 | Stop reason: HOSPADM

## 2019-03-04 RX ORDER — OXYCODONE HYDROCHLORIDE AND ACETAMINOPHEN 5; 325 MG/1; MG/1
2 TABLET ORAL EVERY 4 HOURS PRN
Status: DISCONTINUED | OUTPATIENT
Start: 2019-03-04 | End: 2019-03-06 | Stop reason: HOSPADM

## 2019-03-04 RX ORDER — ONDANSETRON 2 MG/ML
4 INJECTION INTRAMUSCULAR; INTRAVENOUS EVERY 8 HOURS PRN
Status: DISCONTINUED | OUTPATIENT
Start: 2019-03-04 | End: 2019-03-06 | Stop reason: HOSPADM

## 2019-03-04 RX ORDER — OXYTOCIN/RINGER'S LACTATE 30/500 ML
250 PLASTIC BAG, INJECTION (ML) INTRAVENOUS
Status: ACTIVE | OUTPATIENT
Start: 2019-03-04 | End: 2019-03-04

## 2019-03-04 RX ORDER — CALCIUM CARBONATE 200(500)MG
1000 TABLET,CHEWABLE ORAL DAILY PRN
Status: DISCONTINUED | OUTPATIENT
Start: 2019-03-04 | End: 2019-03-06 | Stop reason: HOSPADM

## 2019-03-04 RX ORDER — OXYTOCIN/RINGER'S LACTATE 30/500 ML
1-30 PLASTIC BAG, INJECTION (ML) INTRAVENOUS
Status: DISCONTINUED | OUTPATIENT
Start: 2019-03-04 | End: 2019-03-04

## 2019-03-04 RX ADMIN — IBUPROFEN 600 MG: 600 TABLET ORAL at 08:02

## 2019-03-04 RX ADMIN — WITCH HAZEL 1 PAD: 500 SOLUTION RECTAL; TOPICAL at 07:33

## 2019-03-04 RX ADMIN — LIDOCAINE HYDROCHLORIDE AND EPINEPHRINE 4 ML: 20; 5 INJECTION, SOLUTION EPIDURAL; INFILTRATION; INTRACAUDAL; PERINEURAL at 04:13

## 2019-03-04 RX ADMIN — BENZOCAINE AND LEVOMENTHOL: 200; 5 SPRAY TOPICAL at 07:33

## 2019-03-04 RX ADMIN — Medication 4 MILLI-UNITS/MIN: at 02:35

## 2019-03-04 RX ADMIN — DOCUSATE SODIUM 100 MG: 100 CAPSULE, LIQUID FILLED ORAL at 08:02

## 2019-03-04 RX ADMIN — LIDOCAINE HYDROCHLORIDE AND EPINEPHRINE 4 ML: 20; 5 INJECTION, SOLUTION EPIDURAL; INFILTRATION; INTRACAUDAL; PERINEURAL at 04:16

## 2019-03-04 RX ADMIN — IBUPROFEN 600 MG: 600 TABLET ORAL at 14:13

## 2019-03-04 RX ADMIN — DOCUSATE SODIUM 100 MG: 100 CAPSULE, LIQUID FILLED ORAL at 18:18

## 2019-03-04 NOTE — PLAN OF CARE
Problem: PAIN - ADULT  Goal: Verbalizes/displays adequate comfort level or baseline comfort level  Description  Interventions:  - Encourage patient to monitor pain and request assistance  - Assess pain using appropriate pain scale  - Administer analgesics based on type and severity of pain and evaluate response  - Implement non-pharmacological measures as appropriate and evaluate response  - Consider cultural and social influences on pain and pain management  - Notify physician/advanced practitioner if interventions unsuccessful or patient reports new pain  Outcome: Progressing     Problem: INFECTION - ADULT  Goal: Absence or prevention of progression during hospitalization  Description  INTERVENTIONS:  - Assess and monitor for signs and symptoms of infection  - Monitor lab/diagnostic results  - Monitor all insertion sites, i e  indwelling lines, tubes, and drains  - Monitor endotracheal (as able) and nasal secretions for changes in amount and color  - [place] appropriate cooling/warming therapies per order  - Administer medications as ordered  - Instruct and encourage patient and family to use good hand hygiene technique  - Identify and instruct in appropriate isolation precautions for identified infection/condition  Outcome: Progressing  Goal: Absence of fever/infection during neutropenic period  Description  INTERVENTIONS:  - Monitor WBC  - Implement neutropenic guidelines  Outcome: Progressing     Problem: SAFETY ADULT  Goal: Patient will remain free of falls  Description  INTERVENTIONS:  - Assess patient frequently for physical needs  -  Identify cognitive and physical deficits and behaviors that affect risk of falls    -  [place] fall precautions as indicated by assessment   - Educate patient/family on patient safety including physical limitations  - Instruct patient to call for assistance with activity based on assessment  - Modify environment to reduce risk of injury  - Consider OT/PT consult to assist with strengthening/mobility  Outcome: Progressing  Goal: Maintain or return to baseline ADL function  Description  INTERVENTIONS:  -  Assess patient's ability to carry out ADLs; assess patient's baseline for ADL function and identify physical deficits which impact ability to perform ADLs (bathing, care of mouth/teeth, toileting, grooming, dressing, etc )  - Assess/evaluate cause of self-care deficits   - Assess range of motion  - Assess patient's mobility; develop plan if impaired  - Assess patient's need for assistive devices and provide as appropriate  - Encourage maximum independence but intervene and supervise when necessary  ¯ Involve family in performance of ADLs  ¯ Assess for home care needs following discharge   ¯ Request OT consult to assist with ADL evaluation and planning for discharge  ¯ Provide patient education as appropriate  Outcome: Progressing  Goal: Maintain or return mobility status to optimal level  Description  INTERVENTIONS:  - Assess patient's baseline mobility status (ambulation, transfers, stairs, etc )    - Identify cognitive and physical deficits and behaviors that affect mobility  - Identify mobility aids required to assist with transfers and/or ambulation (gait belt, sit-to-stand, lift, walker, cane, etc )  - [place] fall precautions as indicated by assessment  - Record patient progress and toleration of activity level on Mobility SBAR; progress patient to next Phase/Stage  - Instruct patient to call for assistance with activity based on assessment  - Request Rehabilitation consult to assist with strengthening/weightbearing, etc   Outcome: Progressing     Problem: Knowledge Deficit  Goal: Patient/family/caregiver demonstrates understanding of disease process, treatment plan, medications, and discharge instructions  Description  Complete learning assessment and assess knowledge base    Interventions:  - Provide teaching at level of understanding  - Provide teaching via preferred learning methods  Outcome: Progressing     Problem: DISCHARGE PLANNING  Goal: Discharge to home or other facility with appropriate resources  Description  INTERVENTIONS:  - Identify barriers to discharge w/patient and caregiver  - Arrange for needed discharge resources and transportation as appropriate  - Identify discharge learning needs (meds, wound care, etc )  - Arrange for interpretive services to assist at discharge as needed  - Refer to Case Management Department for coordinating discharge planning if the patient needs post-hospital services based on physician/advanced practitioner order or complex needs related to functional status, cognitive ability, or social support system  Outcome: Progressing     Problem: POSTPARTUM  Goal: Experiences normal postpartum course  Description  INTERVENTIONS:  - Monitor maternal vital signs  - Assess uterine involution and lochia  Outcome: Progressing  Goal: Appropriate maternal -  bonding  Description  INTERVENTIONS:  - Identify family support  - Assess for appropriate maternal/infant bonding   -Encourage maternal/infant bonding opportunities  - Referral to  or  as needed  Outcome: Progressing  Goal: Establishment of infant feeding pattern  Description  INTERVENTIONS:  - Assess breast/bottle feeding  - Refer to lactation as needed  Outcome: Progressing  Goal: Incision(s), wounds(s) or drain site(s) healing without S/S of infection  Description  INTERVENTIONS  - Assess and document risk factors for skin impairment   - Assess and document dressing, incision, wound bed, drain sites and surrounding tissue  - Initiate Nutrition services consult and/or wound management as needed  Outcome: Progressing

## 2019-03-04 NOTE — OB LABOR/OXYTOCIN SAFETY PROGRESS
Labor Progress Note - March Becker 29 y o  female MRN: [id number]    Unit/Bed#: -01 Encounter: [id number]    OB History    Para Term  AB Living   3 2 2 0 0 2   SAB TAB Ectopic Multiple Live Births   0 0 0 0 2     Gestational Age: 37w0d     Contraction Frequency (minutes): 2 5-4  Contraction Quality: Moderate  Tachysystole: No   Dilation: 4        Effacement (%): 80  Station: -2  Baseline Rate: 140 bpm  Fetal Heart Rate: 140 BPM  FHR Category: Category I          Notes/comments:     Amniotomy was performed with clear, bloody fluid  Cervical exam is now 4/80/-2  Epidural is in place     FHT: 140/moderate/accelerations, no decelerations   [place]: q2 5 - 4 minutes  Plan: Continue to titrate pitocin and re-assess as necessary            Roula Walker MD 3/3/2019 11:50 PM

## 2019-03-04 NOTE — PLAN OF CARE
Problem: PAIN - ADULT  Goal: Verbalizes/displays adequate comfort level or baseline comfort level  Description  Interventions:  - Encourage patient to monitor pain and request assistance  - Assess pain using appropriate pain scale  - Administer analgesics based on type and severity of pain and evaluate response  - Implement non-pharmacological measures as appropriate and evaluate response  - Consider cultural and social influences on pain and pain management  - Notify physician/advanced practitioner if interventions unsuccessful or patient reports new pain  3/4/2019 0854 by Siddharth Ortiz RN  Outcome: Progressing  3/4/2019 0724 by Siddharth Ortiz RN  Outcome: Progressing     Problem: INFECTION - ADULT  Goal: Absence or prevention of progression during hospitalization  Description  INTERVENTIONS:  - Assess and monitor for signs and symptoms of infection  - Monitor lab/diagnostic results  - Monitor all insertion sites, i e  indwelling lines, tubes, and drains  - Monitor endotracheal (as able) and nasal secretions for changes in amount and color  - [place] appropriate cooling/warming therapies per order  - Administer medications as ordered  - Instruct and encourage patient and family to use good hand hygiene technique  - Identify and instruct in appropriate isolation precautions for identified infection/condition  3/4/2019 0854 by Siddharth Ortiz RN  Outcome: Progressing  3/4/2019 0724 by Siddharth Ortiz RN  Outcome: Progressing  Goal: Absence of fever/infection during neutropenic period  Description  INTERVENTIONS:  - Monitor WBC  - Implement neutropenic guidelines  3/4/2019 0854 by Siddharth Ortiz RN  Outcome: Progressing  3/4/2019 0724 by Siddharth Ortiz RN  Outcome: Progressing     Problem: SAFETY ADULT  Goal: Patient will remain free of falls  Description  INTERVENTIONS:  - Assess patient frequently for physical needs  -  Identify cognitive and physical deficits and behaviors that affect risk of falls   -  [place] fall precautions as indicated by assessment   - Educate patient/family on patient safety including physical limitations  - Instruct patient to call for assistance with activity based on assessment  - Modify environment to reduce risk of injury  - Consider OT/PT consult to assist with strengthening/mobility  3/4/2019 0854 by Doretha Solorzano RN  Outcome: Progressing  3/4/2019 0724 by Doretha Solorzano RN  Outcome: Progressing  Goal: Maintain or return to baseline ADL function  Description  INTERVENTIONS:  -  Assess patient's ability to carry out ADLs; assess patient's baseline for ADL function and identify physical deficits which impact ability to perform ADLs (bathing, care of mouth/teeth, toileting, grooming, dressing, etc )  - Assess/evaluate cause of self-care deficits   - Assess range of motion  - Assess patient's mobility; develop plan if impaired  - Assess patient's need for assistive devices and provide as appropriate  - Encourage maximum independence but intervene and supervise when necessary  ¯ Involve family in performance of ADLs  ¯ Assess for home care needs following discharge   ¯ Request OT consult to assist with ADL evaluation and planning for discharge  ¯ Provide patient education as appropriate  3/4/2019 0854 by Doretha Solorzano RN  Outcome: Progressing  3/4/2019 0724 by Doretha Solorzano RN  Outcome: Progressing  Goal: Maintain or return mobility status to optimal level  Description  INTERVENTIONS:  - Assess patient's baseline mobility status (ambulation, transfers, stairs, etc )    - Identify cognitive and physical deficits and behaviors that affect mobility  - Identify mobility aids required to assist with transfers and/or ambulation (gait belt, sit-to-stand, lift, walker, cane, etc )  - [place] fall precautions as indicated by assessment  - Record patient progress and toleration of activity level on Mobility SBAR; progress patient to next Phase/Stage  - Instruct patient to call for assistance with activity based on assessment  - Request Rehabilitation consult to assist with strengthening/weightbearing, etc   3/4/2019 0854 by Siddharth Ortiz RN  Outcome: Progressing  3/4/2019 0724 by Siddharth Ortiz RN  Outcome: Progressing     Problem: Knowledge Deficit  Goal: Patient/family/caregiver demonstrates understanding of disease process, treatment plan, medications, and discharge instructions  Description  Complete learning assessment and assess knowledge base    Interventions:  - Provide teaching at level of understanding  - Provide teaching via preferred learning methods  3/4/2019 0854 by Siddharth Ortiz RN  Outcome: Progressing  3/4/2019 0724 by Siddharth Ortiz RN  Outcome: Progressing     Problem: DISCHARGE PLANNING  Goal: Discharge to home or other facility with appropriate resources  Description  INTERVENTIONS:  - Identify barriers to discharge w/patient and caregiver  - Arrange for needed discharge resources and transportation as appropriate  - Identify discharge learning needs (meds, wound care, etc )  - Arrange for interpretive services to assist at discharge as needed  - Refer to Case Management Department for coordinating discharge planning if the patient needs post-hospital services based on physician/advanced practitioner order or complex needs related to functional status, cognitive ability, or social support system  3/4/2019 0854 by Siddharth Ortiz RN  Outcome: Progressing  3/4/2019 0724 by Siddharth Ortiz RN  Outcome: Progressing     Problem: POSTPARTUM  Goal: Experiences normal postpartum course  Description  INTERVENTIONS:  - Monitor maternal vital signs  - Assess uterine involution and lochia  3/4/2019 0854 by Siddharth Ortiz RN  Outcome: Progressing  3/4/2019 0724 by Siddharth Ortiz RN  Outcome: Progressing  Goal: Appropriate maternal -  bonding  Description  INTERVENTIONS:  - Identify family support  - Assess for appropriate maternal/infant bonding   -Encourage maternal/infant bonding opportunities  - Referral to  or  as needed  3/4/2019 0854 by Leana Kirby RN  Outcome: Progressing  3/4/2019 0724 by Leana Kirby RN  Outcome: Progressing  Goal: Establishment of infant feeding pattern  Description  INTERVENTIONS:  - Assess breast/bottle feeding  - Refer to lactation as needed  3/4/2019 0854 by Leana Kirby RN  Outcome: Progressing  3/4/2019 0724 by Leana Kirby RN  Outcome: Progressing  Goal: Incision(s), wounds(s) or drain site(s) healing without S/S of infection  Description  INTERVENTIONS  - Assess and document risk factors for skin impairment   - Assess and document dressing, incision, wound bed, drain sites and surrounding tissue  - Initiate Nutrition services consult and/or wound management as needed  3/4/2019 0854 by Leana Kirby RN  Outcome: Progressing  3/4/2019 0724 by Leana Kirby RN  Outcome: Progressing

## 2019-03-04 NOTE — L&D DELIVERY NOTE
Vaginal Delivery Summary - OB/GYN   Jessi Johnson 29 y o  female MRN: [id number]  Unit/Bed#: -01 Encounter: [id number]    Predelivery Diagnosis:  1  Pregnancy at 40w1d   2  Anemia    Postdelivery Diagnosis:  1  Same as above  2  Delivery of term     Procedure: Spontaneous Vaginal Delivery, repair of second degree perineal laceration    Attending: Elisa Peace    Assistant: Constance     Anesthesia: Epidural    QBL: 147cc  Admission H 9  Admission platelets: 190    Complications: none apparent    Specimens: cord blood, arterial and venous cord blood gasses, placenta to storage    Findings:   1  Viable male at 5:13, with APGARS of 8 and 9 at 1 and 5 minutes respectively,  2  Spontaneous delivery of intact placenta at 5:17  3  2 degree laceration repaired with 3-0 Vicryl Rapide   4  Blood gases:   Arterial pH: 7 126   Arterial base excess: -11   Venous pH: 7 268   Venous base excess: -8 0    Disposition:  Patient tolerated the procedure well and was recovering in labor and delivery room     Brief history and labor course:  Ms Jessi Johnson is a 29 y o   at 40wk1d  She presented to labor and delivery in labor  She was admitted, had an epidural placed and amniotomy was performed  Pitocin was used to augment labor  Patient had an intermittent Category 2 tracing and received an amnioinfusion  Patient became complete at 4:58 and started pushing at 5:03  Description of procedure    After pushing for 10 minutes, at 5:13 patient delivered a viable male , wt pending, apgars of 8 (1 min) and 9 (5 min)  The fetal vertex delivered spontaneously  Baby was checked for nuchal  There was a tight nuchal cord that was reduced on the perineum  The left anterior shoulder delivered atraumatically with maternal expulsive forces and the assistance of downward traction  The right posterior shoulder delivered with maternal expulsive forces and the assistance of upward traction   The remainder of the fetus delivered spontaneously  Upon delivery, the infant was placed on the mothers abdomen and the cord was clamped and cut  Delayed cord clamping was performed  The infant was noted to cry spontaneously and was moving all extremities appropriately  There was no evidence for injury  Awaiting nurse resuscitators evaluated the   Arterial and venous cord blood gases and cord blood was collected for analysis  These were promptly sent to the lab  In the immediate post-partum, 30 units of IV pitocin was administered, and the uterus was noted to contract down well with massage and pitocin  The placenta delivered spontaneously at 5:17 and was noted to have a centrally inserted 3 vessel cord  The vagina, cervix, perineum, and rectum were inspected and there was noted to be a second degree perineal laceration that was repaired with 3-0 Vicryl Rapide  Good hemostasis and tissue approximation were achieved with a running single layer closure  At the conclusion of the procedure, all needle, sponge, and instrument counts were noted to be correct  Patient tolerated the procedure well and was allowed to recover in labor and delivery room with family and  before being transferred to the post-partum floor  Dr Robin Morales was present and participated in all key portions of the case      Nilson Smith MD  3/4/2019  5:53 AM

## 2019-03-04 NOTE — OB LABOR/OXYTOCIN SAFETY PROGRESS
Labor Progress Note - Marsha Esposito 29 y o  female MRN: [id number]    Unit/Bed#: -01 Encounter: [id number]    OB History    Para Term  AB Living   3 2 2 0 0 2   SAB TAB Ectopic Multiple Live Births   0 0 0 0 2     Gestational Age: 40w1d     Contraction Frequency (minutes): 2-3 5  Contraction Quality: Moderate  Tachysystole: No   Dilation: 4        Effacement (%): 80  Station: -2  Baseline Rate: 135 bpm  Fetal Heart Rate: 140 BPM  FHR Category: Category I          Notes/comments:   No cervical change so discussed options with pt and will start pitocin  Comfortable with epidural  Expect           Mary Mena MD 3/4/2019 2:15 AM

## 2019-03-04 NOTE — ANESTHESIA PROCEDURE NOTES
Epidural Block    Patient location during procedure: OB  Start time: 3/3/2019 11:13 PM  Reason for block: at surgeon's request and primary anesthetic  Staffing  Anesthesiologist: Che Hanna MD  Performed: anesthesiologist   Preanesthetic Checklist  Completed: patient identified, surgical consent, pre-op evaluation, timeout performed, IV checked, risks and benefits discussed and monitors and equipment checked  Epidural  Patient position: sitting  Prep: Betadine, site prepped and draped and swab x3  Patient monitoring: heart rate, cardiac monitor, continuous pulse ox and frequent blood pressure checks  Approach: midline  Location: lumbar (1-5)  Injection technique: DANILO saline  Needle  Needle type: Tuohy   Needle gauge: 18 G  Catheter type: side hole  Catheter size: 20 G  Catheter at skin depth: 9 cm  Test dose: negativenegative aspiration for CSF, negative aspiration for heme and no paresthesia on injection  patient tolerated the procedure well with no immediate complications

## 2019-03-04 NOTE — OB LABOR/OXYTOCIN SAFETY PROGRESS
Oxytocin Safety Progress Check Note - Dorian Eng 29 y o  female MRN: [id number]    Unit/Bed#: -01 Encounter: [id number]    OB History    Para Term  AB Living   3 2 2 0 0 2   SAB TAB Ectopic Multiple Live Births   0 0 0 0 2     Gestational Age: 40w1d     Contraction Frequency (minutes): 2  Contraction Quality: Moderate  Tachysystole: No   Dilation: 4        Effacement (%): 80  Station: -2  Baseline Rate: 130 bpm  Fetal Heart Rate: 140 BPM  FHR Category: Category I          Notes/comments:     Patient is feeling comfortable with her epidural  Her cervical exam is unchanged  Will start pitocin               Junito Porter MD 3/4/2019 2:16 AM

## 2019-03-04 NOTE — LACTATION NOTE
This note was copied from a baby's chart  CONSULT - LACTATION  Baby Boy  Lori Jarvis Lola 0 days male MRN: [id number]    [facility] Room / Bed: (N)/(N) Encounter: [id number]    Maternal Information     MOTHER:  Fred Calles  Maternal Age: 29 y o    OB History: #: 1, Date: 11/24/10, Sex: Male, Weight: 3487 g (7 lb 11 oz), GA: 39w3d, Delivery: Vaginal, Spontaneous, Apgar1: None, Apgar5: None, Living: Living, Birth Comments: Healthy Boy    #: 2, Date: 13, Sex: Female, Weight: 3090 g (6 lb 13 oz), GA: 39w6d, Delivery: Vaginal, Spontaneous, Apgar1: None, Apgar5: None, Living: Living, Birth Comments: None    #: 3, Date: 19, Sex: Male, Weight: 3345 g (7 lb 6 oz), GA: 40w1d, Delivery: Vaginal, Spontaneous, Apgar1: 8, Apgar5: 9, Living: Living, Birth Comments: None   Previouse breast reduction surgery? No}    Lactation history:   Has patient previously breast fed: Yes   How long had patient previously breast fed: 4 mo with her daughter   Previous breast feeding complications: None     Past Surgical History:   Procedure Laterality Date    LA KNEE SCOPE,AID ANT CRUCIATE REPAIR Right 9/15/2017    Procedure: KNEE ARTHROSCOPY WITH ANTERIOR CRUCIATE LIGAMENT RECONSTRUCTION USING HAMSTRING AUTOGRAFT;  Surgeon: Irene Dc MD;  Location: [facility];  Service: Orthopedics    WISDOM TOOTH EXTRACTION         Birth information:  YOB: 2018   Time of birth: 5:13 AM   Sex: male   Delivery type: Vaginal, Spontaneous   Birth Weight: 3345 g (7 lb 6 oz)   Percent of Weight Change: 0%     Gestational Age: 44w3d   [unfilled]    Assessment     Breast and nipple assessment: normal assessment     Assessment: normal assessment    Feeding assessment: no latch, sleepy baby   LATCH:  Latch:  Too sleepy or reluctant, no latch achieved   Audible Swallowing: None   Type of Nipple: Everted (After stimulation)   Comfort (Breast/Nipple): Soft/non-tender   Hold (Positioning): No assist from staff, mother able to position/hold infant   LATCH Score: 6          Feeding recommendations:  breast feed on demand     Met with mother  Provided mother with Ready, Set, Baby booklet  Discussed Skin to Skin contact an benefits to mom and baby  Talked about the delay of the first bath until baby has adjusted  Spoke about the benefits of rooming in  Feeding on cue and what that means for recognizing infant's hunger  Avoidance of pacifiers for the first month discussed  Talked about exclusive breastfeeding for the first 6 months  Positioning and latch reviewed as well as showing images of other feeding positions  Discussed the properties of a good latch in any position  Reviewed hand/manual expression  Discussed s/s that baby is getting enough milk and some s/s that breastfeeding dyad may need further help  Gave information on common concerns, what to expect the first few weeks after delivery, preparing for other caregivers, and how partners can help  Resources for support also provided  Discussed 2nd night syndrome and ways to calm infant  Hand out given  Information on hand expression given  Discussed benefits of knowing how to manually express breast including stimulating milk supply, softening nipple for latch and evacuating breast in the event of engorgement  Mom positions infant in cross cradle without assistance  Infant too sleepy to latch at this time  Enc skin to skin and waiting for infant to cue  Ext provided, enc to call for lactation support as needed       Giuliano Louise RN 3/4/2019 6:25 PM

## 2019-03-04 NOTE — H&P
History & Physical - OB/GYN   Jon Olson 29 y o  female MRN: [id number]  Unit/Bed#: -01 Encounter: [id number]    Chief complaint: regular, painful contractions     HPI: Jon Olson is a 29 y o   at 40w0d by [address] who presents with regular, painful contractions  Her cervical is 3/70/-2  Her last exam was -/-3  She states that she has been having contractions since last night and throughout the day  This evening around 18:30, she said that they intensified  Her pregnancy has been uncomplicated  Her previous babies weighed 6lb 13 oz and 7 lbs 11oz       Contractions:  yes  Fetal movement:  yes  Vaginal bleeding:   no  Leaking of fluid:  no    She is a patient of [facility]    Pregnancy Complications:  Patient Active Problem List   Diagnosis    Supervision of normal pregnancy in third trimester    38 weeks gestation of pregnancy    Anemia during pregnancy       PMH:  Past Medical History:   Diagnosis Date    ACL (anterior cruciate ligament) tear     Anemia     During pregnancy    Varicella     As child       PSH:  Past Surgical History:   Procedure Laterality Date    MO KNEE SCOPE,AID ANT CRUCIATE REPAIR Right 9/15/2017    Procedure: KNEE ARTHROSCOPY WITH ANTERIOR CRUCIATE LIGAMENT RECONSTRUCTION USING HAMSTRING AUTOGRAFT;  Surgeon: Brit Henao MD;  Location: [facility];  Service: Orthopedics    WISDOM TOOTH EXTRACTION         Social Hx:  Smoking: none   Alcohol use in pregnancy: none  Illicit drug: none     OB Hx:  OB History    Para Term  AB Living   3 2 2 0 0 2   SAB TAB Ectopic Multiple Live Births   0 0 0 0 2      # Outcome Date GA Lbr Gurmeet/2nd Weight Sex Delivery Anes PTL Lv   3 Current            2 Term 13 39w6d  3090 g (6 lb 13 oz) F Vag-Spont EPI N BRYNN      Name: Daily Villalta   1 Term 11/24/10 39w3d  3487 g (7 lb 11 oz) M Vag-Spont EPI N BRYNN      Birth Comments: Healthy Boy      Name: Ping Hutton       Meds:  No current facility-administered medications on file prior to encounter  Current Outpatient Medications on File Prior to Encounter   Medication Sig Dispense Refill    Prenatal MV-Min-Fe Fum-FA-DHA (PRENATAL+DHA PO) Take by mouth         Allergies: Allergies   Allergen Reactions    Amoxicillin Hives       Labs:  Blood type: O+  Antibody: negative  Group B strep: negative  HIV: negative  Hepatitis B: negative  RPR: NR  Rubella: Immune  Varicella Immune  1 hour Glucose: 78    Physical Exam:  /78 (BP Location: Right arm)   Pulse 61   Temp 98 3 °F (36 8 °C) (Oral)   Resp 18   Ht 5' 6" (1 676 m)   Wt 89 8 kg (198 lb)   LMP 2018 (Exact Date)   SpO2 96%   BMI 31 96 kg/m²     Physical Exam   Constitutional: She is oriented to person, place, and time  She appears well-developed and well-nourished  Neck: Normal range of motion  Cardiovascular: Normal rate, regular rhythm and normal heart sounds  Exam reveals no gallop  No murmur heard  Pulmonary/Chest: Effort normal and breath sounds normal    Abdominal: Soft  Musculoskeletal: She exhibits edema  Neurological: She is alert and oriented to person, place, and time  Skin: Skin is warm and dry  Psychiatric: She has a normal mood and affect  Her behavior is normal  Judgment and thought content normal      Estimated Fetal Weight: 7 lbs 6oz  Presentation: vertex    SVE: 3 / 70% / -2  FHT:  130 / Moderate 6 - 25 bpm / accelerations, no decelerations   [place]: q2-5 minutes    Membranes: intact     Assessment:  Partha Dose is a 29 y o   at 40w0d by [address] [facility] who presents with regular, painful contractions  Her cervical is 3/70/-2  Plan:   1  Admit to L&D  2  CBC, RPR, type and screen  3  Clear liquid diet and LR @125cc/hour   4  GBS negative   Epidural upon request    Discussed with Dr Liz Leal

## 2019-03-04 NOTE — DISCHARGE SUMMARY
Discharge Summary - OB/GYN   Dorian Eng 29 y o  female MRN: [id number]  Unit/Bed#: -01 Encounter: [id number]      Admission Date: 3/3/2019     Discharge Date: 3/6/2019    Admitting Diagnosis:   1  Pregnancy at 40w1d  2  Anemia     Discharge Diagnosis:   Same, delivered      Procedures: spontaneous vaginal delivery    Attending: Chuckie Bowers MD    Hospital Course:     Ms Dorian Eng is a 29 y o  [id number] at 40wk1d  She presented to labor and delivery in labor  She was admitted, had an epidural placed and amniotomy was performed  Pitocin was used to augment labor  Patient had an intermittent Category 2 tracing and received an amnioinfusion  Patient became complete at 4:58 and started pushing at 5:03  She delivered a viable male  on 3/4/2019 at 5:13  Weight 7lbs 6oz via spontaneous vaginal delivery  Apgars were 8 (1 min) and 9 (5 min)   was transferred to  nursery  Patient tolerated the procedure well and was transferred to recovery in stable condition  Her post-partum course was uncomplicated  Her post-partum pain was well controlled with oral analgesics  On day of discharge, she was ambulating and able to reasonably perform all ADLs  She was voiding and had appropriate bowel function  Pain was well controlled  She was discharged home on post-partum day #2 without complications  Patient was instructed to follow up with her OB as an outpatient and was given appropriate warnings to call provider if she develops signs of infection or uncontrolled pain  Complications: none apparent    Condition at discharge: stable     Discharge instructions/Information to patient and family:   See after visit summary for information provided to patient and family  Provisions for Follow-Up Care:  See after visit summary for information related to follow-up care and any pertinent home health orders  Disposition: Home    Planned Readmission: No    Discharge Medications:   For a complete list of the patient's medications, please refer to her med rec

## 2019-03-04 NOTE — PROGRESS NOTES
An amnioinfusion was started at this time and was stopped at 0458, with approx  200 mL of LR infused

## 2019-03-04 NOTE — OB LABOR/OXYTOCIN SAFETY PROGRESS
Oxytocin Safety Progress Check Note - Orval Lager 29 y o  female MRN: [id number]    Unit/Bed#: -01 Encounter: [id number]    OB History    Para Term  AB Living   3 2 2 0 0 2   SAB TAB Ectopic Multiple Live Births   0 0 0 0 2     Gestational Age: 40w1d  Dose (dominguez-units/min) Oxytocin: 12 dominguez-units/min  Contraction Frequency (minutes): 2-3  Contraction Quality: Strong  Tachysystole: No   Dilation: 9        Effacement (%): 100  Station: 0  Baseline Rate: 135 bpm  Fetal Heart Rate: 140 BPM  FHR Category: Category I          Notes/comments:     Patient is having recurrent variable decelerations  Cervical exam is now 9/100/0     FHT: Category 2: 130/moderate/accelerations, no decelerations   [place]: q2-3 minutes  Plan: amnioinfusion and continue expectant management             Radha Johnson MD 3/4/2019 4:46 AM

## 2019-03-04 NOTE — DISCHARGE INSTRUCTIONS
Self Care After Delivery   AMBULATORY CARE:   The postpartum period  is the period of time from delivery to about 6 weeks  During this time you may experience many physical and emotional changes  It is important to understand what is normal and when you need to call your healthcare provider  It is also important to know how to care for yourself during this time  Call 911 for any of the following:   · You soak through 1 pad in 15 minutes, have blurry vision, clammy or pale skin, and feel faint  · You faint or lose consciousness  · Your heart is beating faster than normal      · You have trouble breathing  · You cough up blood  Seek care immediately if:   · You soak through 1 or more pads in an hour, or pass blood clots larger than a quarter from your vagina  · Your leg is painful, red, and larger than normal      · You have severe abdominal pain  · You have a bad headache or changes in your vision  · Your episiotomy or C section incision is red, swollen, bleeding, or draining pus  · Your incision comes apart  · You see or hear things that are not there, or have thoughts of harming yourself or your baby  Contact your obstetrician or midwife if:   · You have a fever  · You have new or worsening pain in your abdomen or vagina  · You continue to have the baby blues 10 days after you deliver  · You have trouble sleeping  · You have foul-smelling discharge from your vagina  · You have pain or burning when you urinate  · You do not have a bowel movement for 3 days or more  · You have nausea or are vomiting  · You have hard lumps or red streaks over your breasts  · You have cracked nipples or bleed from your nipples  · You have questions or concerns about your condition or care  Physical changes:   The following are normal changes after you give birth:  · Pain in the area between your anus and vagina    · Breast pain    · Constipation or hemorrhoids    · Hot or cold flashes    · Vaginal bleeding or discharge    · Mild to moderate abdominal cramping    · Difficulty controlling bowel movements or urine  Emotional changes: The following are symptoms of the baby blues, or normal emotions after you give birth  The changes in your emotions may be caused by a drop in hormone levels after you deliver  If these symptoms last longer than 1 to 2 weeks after you give birth, you may have postpartum depression  · Feeling irritable    · Feeling sad    · Crying for no reason    · Feeling anxious  Breast care for nursing mothers: You may have sore breasts for 3 to 6 days after you give birth  This happens as your milk begins to fill your breasts  You may also have sore breasts if you do not breastfeed frequently  Do the following to care for your breasts:  · Apply a moist, warm, compress to your breast as directed  This may help soothe your breasts  Make sure the washcloth is not too hot before you apply it to your breast      · Nurse your baby or pump your milk frequently  This may prevent clogged milk ducts  Ask your healthcare provider how often to nurse or pump  · Massage your breasts as directed  This may help increase your milk flow  Gently rub your breasts in a circular motion before you breastfeed  You may need to gently squeeze your breast or nipple to help release milk  You can also use a breast pump to help release milk from your breast      · Wash your breasts with warm water only  Do not put soap on your nipples  Soap may cause your nipples to become dry  · Apply lanolin cream to your nipples as directed  Lanolin cream may add moisture to your skin and prevent nipple dryness  Always  wash off lanolin cream with warm water before you breastfeed  · Place pads in your bra  Your nipples may leak milk when you are not breastfeeding  You can place pads inside of your bra to help prevent leaking onto your clothing   Ask your healthcare provider where to purchase bra pads  · Get breastfeeding support if needed  There are healthcare providers who can answer questions about breastfeeding and provide you with support  Ask your healthcare provider who you can contact if you need breastfeeding support  Breast care for non-nursing mothers:  Milk will fill your breasts even if you bottle feed your baby  Do the following to help stop your milk from filling your breasts and causing pain:  · Wear a bra with support at all times  A sports bra or a tight-fitting bra will help stop your milk from coming in  · Apply ice on each breast for 15 to 20 minutes every hour or as directed  Use an ice pack, or put crushed ice in a plastic bag  Cover it with a towel  Ice helps your milk ducts shrink  · Keep your breasts away from warm water  Warm water will make it easier for milk to fill your breasts  Stand with your breasts away from warm water in the shower  · Limit how much you touch your breasts  This will prevent them from filling with milk  Perineum care: Your perineum is the area between your rectum and vagina  It is normal to have swelling and pain in this area after you give birth  If you had an episiotomy, your healthcare provider may give you special instructions  · Clean your perineum after you use the bathroom  This may prevent infection and help with healing  Use a spray bottle with warm water to clean your perineum  You may also gently spray warm water against your perineum when you urinate  Always wipe front to back  · Take a sitz bath as directed  A sitz bath may help relieve swelling and pain  Fill your bath tub or bucket with water up to your hips and sit in the water  Use cold water for 2 days after you deliver  Then use warm water  Ask your healthcare provider for more information about a sitz bath  · Apply ice packs for the first 24 hours or as directed  Use a plastic glove filled with ice or buy an ice pack   Wrap the ice pack or plastic glove in a small towel or wash cloth  Place the ice pack on your perineum for 20 minutes at a time  · Sit on a donut-shaped pillow  This may relieve pressure on your perineum when you sit  · Use wipes with medicine or take pills as directed  Your healthcare provider may tell you to use witch hazel pads  You can place witch hazel pads in the refrigerator before you apply them to your perineum  He may also tell you to take NSAIDs  Ask your healthcare provider how often to take pills or use wipes with medicine  · Do not go swimming or take tub baths for 4 to 6 weeks or as directed  This will help prevent an infection in your vagina or uterus  Bowel and bladder care: It may take 3 to 5 days to have a bowel movement after you deliver your baby  You can do the following to prevent or manage constipation, and get control of your bowel or bladder:  · Take stool softeners as directed  A stool softener is medicine that will make your bowel movements softer  This may prevent or relieve constipation  A stool softener may also make bowel movements less painful  · Drink plenty of liquids  Ask how much liquid to drink each day and which liquids are best for you  Liquids may help prevent constipation  · Eat foods high in fiber  Examples include fruits, vegetables, grains, beans, and lentils  Ask your healthcare provider how much fiber you need each day  Fiber may prevent constipation  · Do Kegel exercises as directed  Kegel exercises will help strengthen the muscles that control bowel movements and urination  Ask your healthcare provider for more information on Kegel exercises  · Apply cold compresses or medicine to hemorrhoids as directed  This may relieve swelling and pain  Your healthcare provider may tell you to apply ice or wipes with medicine to your hemorrhoids  He may also tell you to use a sitz bath   Ask your healthcare for more information on how to manage hemorrhoids  Nutrition:  Good nutrition is important in the postpartum period  It will help you return to a healthy weight, increase your energy levels, and prevent constipation  It will also help you get enough nutrients and calories if you are going to breastfeed your baby  · Eat a variety of healthy foods  Healthy foods include fruits, vegetables, whole-grain breads, low-fat dairy products, beans, lean meats, and fish  You may need 500 to 700 additional calories each day if you breastfeed your baby  You may also need extra protein  · Limit foods with added sugar and high amounts of fat  These foods are high in calories and low in healthy nutrients  Read food labels so you know how much sugar and fat is in the food you want to eat  · Drink 8 to 10 glasses of water per day  Water will help you make plenty of milk for your baby  It will also help prevent constipation  Drink a glass of water every time you breastfeed your baby  · Take vitamins as directed  Ask your healthcare provider what vitamins you need  · Limit caffeine and alcohol if you are breastfeeding  Caffeine and alcohol can get into your breast milk  Caffeine and alcohol can make your baby fussy  They can also interfere with your baby's sleep  Ask your healthcare provider if you can drink alcohol or caffeine  Rest and sleep: You may feel very tired in the postpartum period  Enough sleep will help you heal and give you energy to care for your baby  The following may help you get sleep and rest:  · Nap when your baby naps  Your baby may nap several times during the day  Get rest during this time  · Limit visitors  Many people may want to see you and your baby  Ask friends or family to visit on different days  This will give you time to rest      · Do not plan too much for one day  Put off household chores so that you have time to rest  Gradually do more each day  · Ask for help from family, friends, or neighbors    Ask them to help you with laundry, cleaning, or errands  Also ask someone to watch the baby while you take a nap or relax  Ask your partner to help with the care of your baby  Pump some of your breast milk so your partner can feed your baby during the night  Exercise after delivery:  Wait until your healthcare provider says it is okay to exercise  Exercise can help you lose weight, increase your energy levels, and manage your mood  It can also prevent constipation and blood clots  Start with gentle exercises such as walking  Do more as you have more energy  You may need to avoid abdominal exercises for 1 to 2 weeks after you deliver  Talk to your healthcare provider about an exercise plan that is right for you  Sexual activity after delivery:   · Do not have sex until your healthcare provider says it is okay  You may need to wait 4 to 6 weeks before you have sex  This may prevent infection and allow time to heal      · Your menstrual cycle may begin as soon as 3 weeks after you deliver  Your period may be delayed if you breastfeed your baby  You can become pregnant before you get your first postpartum period  Talk to your healthcare provider about birth control that is right for you  Some types of birth control are not safe during breastfeeding  For support and more information:  Join a support group for new mothers  Ask for help from family and friends with chores, errands, and care of your baby  · Office of Women's Health,  Department of Health and Human Services  [address], [id number] [place] [address]  [address], [address] [place] [place]  [place] , [address]  Phone: 2- 404 - [phone]  Web Address: www womenshealth gov  · [facility] Postpartum [address] [facility] , [address] [place] [address]  [address]  Web Address: [employer] be  org/pregnancy/postpartum-care  aspx  Follow up with your obstetrician or midwife as directed:   You will need to follow up with your healthcare provider in 2 to 6 weeks after delivery  Write down your questions so you remember to ask them at your visits  © 2017 [address] [employer] Information is for End User's use only and may not be sold, redistributed or otherwise used for commercial purposes  All illustrations and images included in CareNotes® are the copyrighted property of A D A M , Inc  or Brando Tyler  The above information is an  only  It is not intended as medical advice for individual conditions or treatments  Talk to your doctor, nurse or pharmacist before following any medical regimen to see if it is safe and effective for you

## 2019-03-04 NOTE — ANESTHESIA POSTPROCEDURE EVALUATION
Post-Op Assessment Note    CV Status:  Stable    Pain management: adequate     Mental Status:  Alert and awake   Hydration Status:  Euvolemic   PONV Controlled:  Controlled   Airway Patency:  Patent   Post Op Vitals Reviewed: Yes      Staff: Anesthesiologist     Post-op block assessment: site cleaned, catheter intact and no complications        BP      Temp      Pulse     Resp      SpO2

## 2019-03-04 NOTE — ANESTHESIA PREPROCEDURE EVALUATION
Anesthesia Plan  ASA Score- 2     Anesthesia Type- epidural with ASA Monitors  Additional Monitors:   Airway Plan:     Comment: Labor Epidural, GA back up for   Risks and benefits discussed with patient, including post-dural puncture headache, bleeding/infection, neurological compromise  Patient not on blood thinners, no history of easy bleeding  She agrees to proceed  plt 243  Plan Factors-    Induction-     Postoperative Plan-     Informed Consent- Anesthetic plan and risks discussed with patient

## 2019-03-04 NOTE — OB LABOR/OXYTOCIN SAFETY PROGRESS
Oxytocin Safety Progress Check Note - Lizeth Larsen 29 y o  female MRN: [id number]    Unit/Bed#: -01 Encounter: [id number]    OB History    Para Term  AB Living   3 2 2 0 0 2   SAB TAB Ectopic Multiple Live Births   0 0 0 0 2     Gestational Age: 40w1d  Dose (dominguez-units/min) Oxytocin: 12 dominguez-units/min  Contraction Frequency (minutes): 1-2  Contraction Quality: Moderate  Tachysystole: No   Dilation: 7        Effacement (%): 80  Station: -1  Baseline Rate: 135 bpm  Fetal Heart Rate: 140 BPM  FHR Category: Category II          Notes/comments:     Patient required bolus due to inadequate pain relief  Cervical exam is now 7/80/-1  Will continue to titrate pitocin     FHT: Category 2: 130/moderate/accelerations, variable decelerations  [place]: q2 minutes  Plan: continue titration             Sergio Maxwell MD 3/4/2019 4:14 AM

## 2019-03-05 RX ADMIN — IBUPROFEN 600 MG: 600 TABLET ORAL at 07:41

## 2019-03-05 RX ADMIN — DOCUSATE SODIUM 100 MG: 100 CAPSULE, LIQUID FILLED ORAL at 07:40

## 2019-03-05 RX ADMIN — IBUPROFEN 600 MG: 600 TABLET ORAL at 00:36

## 2019-03-05 RX ADMIN — DOCUSATE SODIUM 100 MG: 100 CAPSULE, LIQUID FILLED ORAL at 18:16

## 2019-03-05 RX ADMIN — OXYCODONE HYDROCHLORIDE AND ACETAMINOPHEN 1 TABLET: 5; 325 TABLET ORAL at 11:35

## 2019-03-05 RX ADMIN — IBUPROFEN 600 MG: 600 TABLET ORAL at 18:16

## 2019-03-05 NOTE — PLAN OF CARE
Problem: PAIN - ADULT  Goal: Verbalizes/displays adequate comfort level or baseline comfort level  Description  Interventions:  - Encourage patient to monitor pain and request assistance  - Assess pain using appropriate pain scale  - Administer analgesics based on type and severity of pain and evaluate response  - Implement non-pharmacological measures as appropriate and evaluate response  - Consider cultural and social influences on pain and pain management  - Notify physician/advanced practitioner if interventions unsuccessful or patient reports new pain  Outcome: Progressing     Problem: INFECTION - ADULT  Goal: Absence or prevention of progression during hospitalization  Description  INTERVENTIONS:  - Assess and monitor for signs and symptoms of infection  - Monitor lab/diagnostic results  - Monitor all insertion sites, i e  indwelling lines, tubes, and drains  - Monitor endotracheal (as able) and nasal secretions for changes in amount and color  - [place] appropriate cooling/warming therapies per order  - Administer medications as ordered  - Instruct and encourage patient and family to use good hand hygiene technique  - Identify and instruct in appropriate isolation precautions for identified infection/condition  Outcome: Progressing  Goal: Absence of fever/infection during neutropenic period  Description  INTERVENTIONS:  - Monitor WBC  - Implement neutropenic guidelines  Outcome: Progressing     Problem: SAFETY ADULT  Goal: Patient will remain free of falls  Description  INTERVENTIONS:  - Assess patient frequently for physical needs  -  Identify cognitive and physical deficits and behaviors that affect risk of falls    -  [place] fall precautions as indicated by assessment   - Educate patient/family on patient safety including physical limitations  - Instruct patient to call for assistance with activity based on assessment  - Modify environment to reduce risk of injury  - Consider OT/PT consult to assist with strengthening/mobility  Outcome: Progressing  Goal: Maintain or return to baseline ADL function  Description  INTERVENTIONS:  -  Assess patient's ability to carry out ADLs; assess patient's baseline for ADL function and identify physical deficits which impact ability to perform ADLs (bathing, care of mouth/teeth, toileting, grooming, dressing, etc )  - Assess/evaluate cause of self-care deficits   - Assess range of motion  - Assess patient's mobility; develop plan if impaired  - Assess patient's need for assistive devices and provide as appropriate  - Encourage maximum independence but intervene and supervise when necessary  ¯ Involve family in performance of ADLs  ¯ Assess for home care needs following discharge   ¯ Request OT consult to assist with ADL evaluation and planning for discharge  ¯ Provide patient education as appropriate  Outcome: Progressing  Goal: Maintain or return mobility status to optimal level  Description  INTERVENTIONS:  - Assess patient's baseline mobility status (ambulation, transfers, stairs, etc )    - Identify cognitive and physical deficits and behaviors that affect mobility  - Identify mobility aids required to assist with transfers and/or ambulation (gait belt, sit-to-stand, lift, walker, cane, etc )  - [place] fall precautions as indicated by assessment  - Record patient progress and toleration of activity level on Mobility SBAR; progress patient to next Phase/Stage  - Instruct patient to call for assistance with activity based on assessment  - Request Rehabilitation consult to assist with strengthening/weightbearing, etc   Outcome: Progressing     Problem: Knowledge Deficit  Goal: Patient/family/caregiver demonstrates understanding of disease process, treatment plan, medications, and discharge instructions  Description  Complete learning assessment and assess knowledge base    Interventions:  - Provide teaching at level of understanding  - Provide teaching via preferred learning methods  Outcome: Progressing     Problem: DISCHARGE PLANNING  Goal: Discharge to home or other facility with appropriate resources  Description  INTERVENTIONS:  - Identify barriers to discharge w/patient and caregiver  - Arrange for needed discharge resources and transportation as appropriate  - Identify discharge learning needs (meds, wound care, etc )  - Arrange for interpretive services to assist at discharge as needed  - Refer to Case Management Department for coordinating discharge planning if the patient needs post-hospital services based on physician/advanced practitioner order or complex needs related to functional status, cognitive ability, or social support system  Outcome: Progressing     Problem: POSTPARTUM  Goal: Experiences normal postpartum course  Description  INTERVENTIONS:  - Monitor maternal vital signs  - Assess uterine involution and lochia  Outcome: Progressing  Goal: Appropriate maternal -  bonding  Description  INTERVENTIONS:  - Identify family support  - Assess for appropriate maternal/infant bonding   -Encourage maternal/infant bonding opportunities  - Referral to  or  as needed  Outcome: Progressing  Goal: Establishment of infant feeding pattern  Description  INTERVENTIONS:  - Assess breast/bottle feeding  - Refer to lactation as needed  Outcome: Progressing  Goal: Incision(s), wounds(s) or drain site(s) healing without S/S of infection  Description  INTERVENTIONS  - Assess and document risk factors for skin impairment   - Assess and document dressing, incision, wound bed, drain sites and surrounding tissue  - Initiate Nutrition services consult and/or wound management as needed  Outcome: Progressing

## 2019-03-05 NOTE — PROGRESS NOTES
Postpartum Progress Note       PROCEDURE: Spontaneous Vaginal Delivery    SUBJECTIVE:    Pain: no    Tolerating Oral Intake: yes     Voiding: yes    Flatus: yes    Bowel Movement: no    Ambulating: yes    Breastfeeding: yes    Chest Pain: no    Shortness of Breath: no    Leg Pain/Discomfort: no    Lochia: moderate    Other: Denies, N/V      OBJECTIVE:    General: No Acute Distress     Cardiovascular: Regular, Rate and Rhythm, no murmur, gallop or rub     Lungs: Clear to Auscultation Bilaterally, no wheezing, rhonchi or rales     Abdomen: Soft, non-distended, non-tender, no rebound, guarding or CVA tenderness     Fundus: Firm & Non-Tender     Fundal Location:   -1 cm below the umbilicus    Lower Extremities: Non-Tender    Vitals:    03/05/19 0439   BP:    Pulse: 72   Resp:    Temp:    SpO2:        Results from last 7 days   Lab Units 03/03/19  2219   WBC Thousand/uL 11 34*   HEMOGLOBIN g/dL 9 9*   HEMATOCRIT % 31 0*   PLATELETS Thousands/uL 243           Invalid input(s): LABALBU      No results found for: PHOS           No results found for: TROPONINI  ABG:No results found for: PHART, FGF5QKC, PO2ART, OGZ6LNJ, S3YNHFZQ, BEART, SOURCE  LABS / TESTS / MEDICATION:      Admission on 03/03/2019   Component Date Value    ABO Grouping 03/03/2019 O     Rh Factor 03/03/2019 Positive     Antibody Screen 03/03/2019 Negative     Specimen Expiration Date 03/03/2019 [id number]     WBC 03/03/2019 11 34*    RBC 03/03/2019 3 77*    Hemoglobin 03/03/2019 9 9*    Hematocrit 03/03/2019 31 0*    MCV 03/03/2019 82     MCH 03/03/2019 26 3*    MCHC 03/03/2019 31 9     RDW 03/03/2019 13 9     Platelets 01/87/8422 243     MPV 03/03/2019 10 9     RPR 03/03/2019 Non-Reactive     pH, Cord Art 03/04/2019 7 126*    pCO2, Cord Art 03/04/2019 58 5     pO2, Cord Art 03/04/2019 16 8     HCO3, Cord Art 03/04/2019 18 8     Base Exc, Cord Art 03/04/2019 -11 0*    O2 Content, Cord Art 03/04/2019 4 7     O2 Hgb, Arterial Cord 2019 22 3     pH, Cord Tyrone 2019 7 268     pCO2, Cord [place] 2019 41 6     pO2, Cord [place]one 2019 26 7     HCO3, Cord Tyrone 2019 18 6     Base Exc, Cord Tyrone 2019 -8 0*    O2 Cont, Cord [place] 2019 11 9     O2 HGB,VENOUS CORD 2019 54 4        Current Facility-Administered Medications   Medication Dose Route Frequency    acetaminophen (TYLENOL) tablet 650 mg  650 mg Oral Q6H PRN    benzocaine-menthol-lanolin-aloe (DERMOPLAST) 20-0 5 % topical spray   Topical 4x Daily PRN    calcium carbonate (TUMS) chewable tablet 1,000 mg  1,000 mg Oral Daily PRN    docusate sodium (COLACE) capsule 100 mg  100 mg Oral BID    ibuprofen (MOTRIN) tablet 600 mg  600 mg Oral Q6H PRN    ondansetron (ZOFRAN) injection 4 mg  4 mg Intravenous Q8H PRN    oxyCODONE-acetaminophen (PERCOCET) 5-325 mg per tablet 1 tablet  1 tablet Oral Q4H PRN    oxyCODONE-acetaminophen (PERCOCET) 5-325 mg per tablet 2 tablet  2 tablet Oral Q4H PRN    witch hazel-glycerin (TUCKS) topical pad 1 pad  1 pad Topical PRN       ASSESSMENT AND PLAN:   Postpartum day # 1   Status post:   1  Continue Routine Postpartum Care  2  Encourage Ambulation  3  Advance diet as tolerated  4  Plan Contraception discussed: no method  5   Anticipate discharge tomorrow    Signature/Title: Hampton Cooks, MD  Date: 3/5/2019  Time: 6:22 AM

## 2019-03-06 VITALS
BODY MASS INDEX: 31.82 KG/M2 | HEIGHT: 66 IN | OXYGEN SATURATION: 100 % | SYSTOLIC BLOOD PRESSURE: 137 MMHG | HEART RATE: 75 BPM | WEIGHT: 198 LBS | RESPIRATION RATE: 20 BRPM | TEMPERATURE: 98.1 F | DIASTOLIC BLOOD PRESSURE: 81 MMHG

## 2019-03-06 PROBLEM — Z3A.40 40 WEEKS GESTATION OF PREGNANCY: Status: RESOLVED | Noted: 2019-03-03 | Resolved: 2019-03-06

## 2019-03-06 PROBLEM — Z34.93 SUPERVISION OF NORMAL PREGNANCY IN THIRD TRIMESTER: Status: RESOLVED | Noted: 2018-08-31 | Resolved: 2019-03-06

## 2019-03-06 RX ADMIN — IBUPROFEN 600 MG: 600 TABLET ORAL at 12:02

## 2019-03-06 RX ADMIN — DOCUSATE SODIUM 100 MG: 100 CAPSULE, LIQUID FILLED ORAL at 10:28

## 2019-03-06 RX ADMIN — IBUPROFEN 600 MG: 600 TABLET ORAL at 03:31

## 2019-03-06 NOTE — PROGRESS NOTES
POSTPARTUM NOTE  Abdoul Walden 29 y o  female MRN: [id number]  Unit/Bed#: -01 Encounter: [id number]    Date: 03/06/19  Procedure: Spontaneous Vaginal Delivery  PostpartumDay #: 2     SUBJECTIVE:  Pain: no  Tolerating Oral Intake: yes   Voiding: yes  Flatus: yes  Bowel Movement: no  Ambulating: yes  Breastfeeding: yes  Chest Pain: no  Shortness of Breath: no  Leg Pain/Discomfort: no  Lochia: minimal      OBJECTIVE:   Vitals: Temp:  [98 °F (36 7 °C)-98 8 °F (37 1 °C)] 98 8 °F (37 1 °C)  HR:  [53-73] 70  Resp:  [16-20] 16  BP: (132-138)/(73-79) 133/79  General: No Acute Distress   Cardiovascular: Regular, Rate and Rhythm, no murmur, normal S1/S2   Lungs: Clear to Auscultation Bilaterally, no wheezing, non-labored breathing   Abdomen: Soft, non-distended, non-tender, no rebound, guarding or CVA tenderness   Fundus: Firm & Non-Tender, Fundal Location:   -2 cm below the umbilicus  Lower Extremities: Non-tender, Edema Minimal      LABS / TESTS / MEDICATION:    Recent Results (from the past 72 hour(s))   Type and screen    Collection Time: 03/03/19 10:19 PM   Result Value Ref Range    ABO Grouping O     Rh Factor Positive     Antibody Screen Negative     Specimen Expiration Date [id number]    CBC    Collection Time: 03/03/19 10:19 PM   Result Value Ref Range    WBC 11 34 (H) 4 31 - 10 16 Thousand/uL    RBC 3 77 (L) 3 81 - 5 12 Million/uL    Hemoglobin 9 9 (L) 11 5 - 15 4 g/dL    Hematocrit 31 0 (L) 34 8 - 46 1 %    MCV 82 82 - 98 fL    MCH 26 3 (L) 26 8 - 34 3 pg    MCHC 31 9 31 4 - 37 4 g/dL    RDW 13 9 11 6 - 15 1 %    Platelets 888 760 - 934 Thousands/uL    MPV 10 9 8 9 - 12 7 fL   RPR    Collection Time: 03/03/19 10:19 PM   Result Value Ref Range    RPR Non-Reactive Non-Reactive   Blood gas, arterial, cord    Collection Time: 03/04/19  5:16 AM   Result Value Ref Range    pH, Cord Art 7 126 (L) 7 230 - 7 430    pCO2, Cord Art 58 5 30 0 - 60 0    pO2, Cord Art 16 8 5 0 - 25 0 mm HG    HCO3, Cord Art 18 8 17 3 - 27 3 mmol/L    Base Exc, Cord Art -11 0 (L) 3 0 - 11 0 mmol/L    O2 Content, Cord Art 4 7 ml/dl    O2 Hgb, Arterial Cord 22 3 %   Blood gas, venous, cord    Collection Time: 03/04/19  5:16 AM   Result Value Ref Range    pH, Cord Tyrone 7 268 7 190 - 7 490    pCO2, Cord Tyrone 41 6 27 0 - 43 0 mm HG    pO2, Cord Tyrone 26 7 15 0 - 45 0 mm HG    HCO3, Cord Tyrone 18 6 12 2 - 28 6 mmol/L    Base Exc, Cord Tyr[place] -8 0 (L) 1 0 - 9 0 mmol/L    O2 Cont, Cord Tyrone 11 9 mL/dL    O2 HGB,VENOUS CORD 54 4 %         docusate sodium 100 mg Oral BID       acetaminophen 650 mg Q6H PRN   benzocaine-menthol-lanolin-aloe  4x Daily PRN   calcium carbonate 1,000 mg Daily PRN   ibuprofen 600 mg Q6H PRN   ondansetron 4 mg Q8H PRN   oxyCODONE-acetaminophen 1 tablet Q4H PRN   oxyCODONE-acetaminophen 2 tablet Q4H PRN   witch hazel-glycerin 1 pad PRN       ASSESSMENT:   29 y o  [id number] s/p Spontaneous Vaginal Delivery Postpartum day  2  PLAN:  1) Delivery: Continue routine postpartum care, encourage ambulation, advance diet as tolerated  2)Encouraged breastfeeding  3)Anticipated discharge today    Signature / Title: Emmette Simmonds, MD, Family Medicine  Date: 3/6/2019  Time: 6:07 AM

## 2019-03-06 NOTE — LACTATION NOTE
This note was copied from a baby's chart  Tian Delatorre are consistently feeding Brady more than 10 times in a 24 hour period  [place] says she can feel that her milk is starting to come in  Met with mother to go over feeding log since birth for the first week  Emphasized 8 or more (12) feedings in a 24 hour period, what to expect for the number of diapers per day of life and the progression of properties of the  stooling pattern  Discussed s/s that breastfeeding is going well after day 4 and when to get help from a pediatrician or lactation support person after day 4  Booklet included Breast Pumping Instructions, When You Go Back to Work or School, and Breastfeeding Resources for after discharge including access to the number for the [employer]  Discussed s/s engorgement and how to manage with medications and cool compresses as well as s/s mastitis and when to contact physician  Encouraged parents to call for assistance, questions, and concerns about breastfeeding  Extension provided

## 2019-03-06 NOTE — PLAN OF CARE
Problem: PAIN - ADULT  Goal: Verbalizes/displays adequate comfort level or baseline comfort level  Description  Interventions:  - Encourage patient to monitor pain and request assistance  - Assess pain using appropriate pain scale  - Administer analgesics based on type and severity of pain and evaluate response  - Implement non-pharmacological measures as appropriate and evaluate response  - Consider cultural and social influences on pain and pain management  - Notify physician/advanced practitioner if interventions unsuccessful or patient reports new pain  Outcome: Completed     Problem: INFECTION - ADULT  Goal: Absence or prevention of progression during hospitalization  Description  INTERVENTIONS:  - Assess and monitor for signs and symptoms of infection  - Monitor lab/diagnostic results  - Monitor all insertion sites, i e  indwelling lines, tubes, and drains  - Monitor endotracheal (as able) and nasal secretions for changes in amount and color  - [place] appropriate cooling/warming therapies per order  - Administer medications as ordered  - Instruct and encourage patient and family to use good hand hygiene technique  - Identify and instruct in appropriate isolation precautions for identified infection/condition  Outcome: Completed  Goal: Absence of fever/infection during neutropenic period  Description  INTERVENTIONS:  - Monitor WBC  - Implement neutropenic guidelines  Outcome: Completed     Problem: SAFETY ADULT  Goal: Patient will remain free of falls  Description  INTERVENTIONS:  - Assess patient frequently for physical needs  -  Identify cognitive and physical deficits and behaviors that affect risk of falls    -  [place] fall precautions as indicated by assessment   - Educate patient/family on patient safety including physical limitations  - Instruct patient to call for assistance with activity based on assessment  - Modify environment to reduce risk of injury  - Consider OT/PT consult to assist with strengthening/mobility  Outcome: Completed  Goal: Maintain or return to baseline ADL function  Description  INTERVENTIONS:  -  Assess patient's ability to carry out ADLs; assess patient's baseline for ADL function and identify physical deficits which impact ability to perform ADLs (bathing, care of mouth/teeth, toileting, grooming, dressing, etc )  - Assess/evaluate cause of self-care deficits   - Assess range of motion  - Assess patient's mobility; develop plan if impaired  - Assess patient's need for assistive devices and provide as appropriate  - Encourage maximum independence but intervene and supervise when necessary  ¯ Involve family in performance of ADLs  ¯ Assess for home care needs following discharge   ¯ Request OT consult to assist with ADL evaluation and planning for discharge  ¯ Provide patient education as appropriate  Outcome: Completed  Goal: Maintain or return mobility status to optimal level  Description  INTERVENTIONS:  - Assess patient's baseline mobility status (ambulation, transfers, stairs, etc )    - Identify cognitive and physical deficits and behaviors that affect mobility  - Identify mobility aids required to assist with transfers and/or ambulation (gait belt, sit-to-stand, lift, walker, cane, etc )  - [place] fall precautions as indicated by assessment  - Record patient progress and toleration of activity level on Mobility SBAR; progress patient to next Phase/Stage  - Instruct patient to call for assistance with activity based on assessment  - Request Rehabilitation consult to assist with strengthening/weightbearing, etc   Outcome: Completed     Problem: Knowledge Deficit  Goal: Patient/family/caregiver demonstrates understanding of disease process, treatment plan, medications, and discharge instructions  Description  Complete learning assessment and assess knowledge base    Interventions:  - Provide teaching at level of understanding  - Provide teaching via preferred learning methods  Outcome: Completed     Problem: DISCHARGE PLANNING  Goal: Discharge to home or other facility with appropriate resources  Description  INTERVENTIONS:  - Identify barriers to discharge w/patient and caregiver  - Arrange for needed discharge resources and transportation as appropriate  - Identify discharge learning needs (meds, wound care, etc )  - Arrange for interpretive services to assist at discharge as needed  - Refer to Case Management Department for coordinating discharge planning if the patient needs post-hospital services based on physician/advanced practitioner order or complex needs related to functional status, cognitive ability, or social support system  Outcome: Completed     Problem: POSTPARTUM  Goal: Experiences normal postpartum course  Description  INTERVENTIONS:  - Monitor maternal vital signs  - Assess uterine involution and lochia  Outcome: Completed  Goal: Appropriate maternal -  bonding  Description  INTERVENTIONS:  - Identify family support  - Assess for appropriate maternal/infant bonding   -Encourage maternal/infant bonding opportunities  - Referral to  or  as needed  Outcome: Completed  Goal: Establishment of infant feeding pattern  Description  INTERVENTIONS:  - Assess breast/bottle feeding  - Refer to lactation as needed  Outcome: Completed  Goal: Incision(s), wounds(s) or drain site(s) healing without S/S of infection  Description  INTERVENTIONS  - Assess and document risk factors for skin impairment   - Assess and document dressing, incision, wound bed, drain sites and surrounding tissue  - Initiate Nutrition services consult and/or wound management as needed  Outcome: Completed

## 2019-03-07 ENCOUNTER — TRANSITIONAL CARE MANAGEMENT (OUTPATIENT)
Dept: FAMILY MEDICINE CLINIC | Facility: CLINIC | Age: 34
End: 2019-03-07

## 2019-03-19 ENCOUNTER — POSTPARTUM VISIT (OUTPATIENT)
Dept: OBGYN CLINIC | Facility: CLINIC | Age: 34
End: 2019-03-19

## 2019-03-19 VITALS — WEIGHT: 176 LBS | BODY MASS INDEX: 28.41 KG/M2 | DIASTOLIC BLOOD PRESSURE: 64 MMHG | SYSTOLIC BLOOD PRESSURE: 102 MMHG

## 2019-03-19 DIAGNOSIS — Z12.4 ENCOUNTER FOR PAPANICOLAOU SMEAR FOR CERVICAL CANCER SCREENING: ICD-10-CM

## 2019-03-19 DIAGNOSIS — Z11.51 SCREENING FOR HPV (HUMAN PAPILLOMAVIRUS): ICD-10-CM

## 2019-03-19 PROCEDURE — G0145 SCR C/V CYTO,THINLAYER,RESCR: HCPCS | Performed by: NURSE PRACTITIONER

## 2019-03-19 PROCEDURE — 99024 POSTOP FOLLOW-UP VISIT: CPT | Performed by: NURSE PRACTITIONER

## 2019-03-19 PROCEDURE — 87624 HPV HI-RISK TYP POOLED RSLT: CPT | Performed by: NURSE PRACTITIONER

## 2019-03-20 LAB
HPV HR 12 DNA CVX QL NAA+PROBE: NEGATIVE
HPV16 DNA CVX QL NAA+PROBE: NEGATIVE
HPV18 DNA CVX QL NAA+PROBE: NEGATIVE

## 2019-03-22 LAB
LAB AP GYN PRIMARY INTERPRETATION: NORMAL
Lab: NORMAL
PLACENTA IN STORAGE: NORMAL

## 2019-03-26 ENCOUNTER — TELEPHONE (OUTPATIENT)
Dept: OBGYN CLINIC | Facility: CLINIC | Age: 34
End: 2019-03-26

## 2019-03-26 DIAGNOSIS — Z30.011 ENCOUNTER FOR INITIAL PRESCRIPTION OF CONTRACEPTIVE PILLS: Primary | ICD-10-CM

## 2019-03-26 RX ORDER — ACETAMINOPHEN AND CODEINE PHOSPHATE 120; 12 MG/5ML; MG/5ML
1 SOLUTION ORAL DAILY
Qty: 90 TABLET | Refills: 4 | Status: SHIPPED | OUTPATIENT
Start: 2019-03-26 | End: 2019-06-20 | Stop reason: ALTCHOICE

## 2019-03-26 NOTE — TELEPHONE ENCOUNTER
Spoke with Pt today via phone call  Pt informed that Rx for OCP "Micronor" was electronically forwarded to pharmacy in EHR by [facility]  Reiterated to Pt that if she has any questions/concerns to contact office

## 2019-03-26 NOTE — TELEPHONE ENCOUNTER
Regarding: Prescription Question  Contact: [phone]  ----- Message from [address] [place] [address] [address], Generic sent at 3/26/2019 10:02 AM EDT -----    Good morning,     Just wanted to check if you were able to send in the prescription for birth control to [facility]      Thanks,   Patricia Purvis

## 2019-03-26 NOTE — PROGRESS NOTES
Assessment/Plan:    Postpartum examination following vaginal delivery  Normal postpartum exam  The patient may advance activity as tolerated and may resume sexual activity at 6 weeks postpartum  She expresses interest in starting POP  for contraception  Reviewed risks/benefits, SE's/AE's and directions for use  Recommended starting this today  Advised of importance of compliance, discussed reasons to use back up and reviewed reasons to call  She will RTO for routine annual gyn exam in 3-6 mos  The patient agrees with plan  Diagnoses and all orders for this visit:    Postpartum examination following vaginal delivery    Encounter for Papanicolaou smear for cervical cancer screening  -     Liquid-based pap, screening  -     HPV High Risk; Future  -     HPV High Risk    Screening for HPV (human papillomavirus)  -     Liquid-based pap, screening  -     HPV High Risk; Future  -     HPV High Risk          Subjective:      Patient ID: Marsha Esposito is a 29 y o  female  This patient presents for routine postpartum visit  She is s/p term  with repair of 2nd degree lac on 3/4/19  Prenatal and intrapartum course was uncomplicated  Since d/c home she has had no complaints  She denies abn bleeding, pelvic pain, breast complaints, bowel/bladder dysfunction, depression/anx  Baby is thriving  Breast feeding  Good support  Interested in [address] for contraception  The following portions of the patient's history were reviewed and updated as appropriate: allergies, current medications, past family history, past medical history, past social history, past surgical history and problem list     Review of Systems   Constitutional: Negative  HENT: Negative  Eyes: Negative  Respiratory: Negative  Cardiovascular: Negative  Gastrointestinal: Negative  Endocrine: Negative  Genitourinary: Negative  Musculoskeletal: Negative  Skin: Negative  Allergic/Immunologic: Negative      Neurological: Negative  Hematological: Negative  Psychiatric/Behavioral: Negative  Objective:      /64   Wt 79 8 kg (176 lb)   BMI 28 41 kg/m²          Physical Exam   Constitutional: She is oriented to person, place, and time  She appears well-developed and well-nourished  HENT:   Head: Normocephalic and atraumatic  Eyes: Pupils are equal, round, and reactive to light  Neck: Normal range of motion  Pulmonary/Chest: Effort normal    Abdominal: Hernia confirmed negative in the right inguinal area and confirmed negative in the left inguinal area  Genitourinary: Rectum normal and uterus normal        There is no rash, tenderness, lesion or injury on the right labia  There is no rash, tenderness, lesion or injury on the left labia  Cervix exhibits no motion tenderness, no discharge and no friability  Right adnexum displays no mass, no tenderness and no fullness  Left adnexum displays no mass, no tenderness and no fullness  No erythema, tenderness or bleeding in the vagina  No vaginal discharge found  Musculoskeletal: Normal range of motion  Lymphadenopathy:        Right: No inguinal adenopathy present  Left: No inguinal adenopathy present  Neurological: She is alert and oriented to person, place, and time  Skin: Skin is warm and dry  Psychiatric: She has a normal mood and affect   Her behavior is normal  Judgment and thought content normal

## 2019-03-26 NOTE — ASSESSMENT & PLAN NOTE
Normal postpartum exam  The patient may advance activity as tolerated and may resume sexual activity at 6 weeks postpartum  She expresses interest in starting POP  for contraception  Reviewed risks/benefits, SE's/AE's and directions for use  Recommended starting this today  Advised of importance of compliance, discussed reasons to use back up and reviewed reasons to call  She will RTO for routine annual gyn exam in 3-6 mos  The patient agrees with plan

## 2019-03-26 NOTE — TELEPHONE ENCOUNTER
Regarding: Prescription Question  Contact: [phone]  ----- Message from [address] [place] [address] [address], Generic sent at 3/26/2019 10:02 AM EDT -----    Good morning,     Just wanted to check if you were able to send in the prescription for birth control to [facility]      Thanks,   [facility]

## 2019-03-26 NOTE — TELEPHONE ENCOUNTER
----- Message from Eileen Vera, [address] sent at 3/26/2019  4:42 PM EDT -----  Normal pap and neg HPV   Please notify patient

## 2019-04-29 NOTE — PLAN OF CARE
Problem: PAIN - ADULT  Goal: Verbalizes/displays adequate comfort level or baseline comfort level  Description  Interventions:  - Encourage patient to monitor pain and request assistance  - Assess pain using appropriate pain scale  - Administer analgesics based on type and severity of pain and evaluate response  - Implement non-pharmacological measures as appropriate and evaluate response  - Consider cultural and social influences on pain and pain management  - Notify physician/advanced practitioner if interventions unsuccessful or patient reports new pain  Outcome: Progressing     Problem: INFECTION - ADULT  Goal: Absence or prevention of progression during hospitalization  Description  INTERVENTIONS:  - Assess and monitor for signs and symptoms of infection  - Monitor lab/diagnostic results  - Monitor all insertion sites, i e  indwelling lines, tubes, and drains  - Monitor endotracheal (as able) and nasal secretions for changes in amount and color  - [place] appropriate cooling/warming therapies per order  - Administer medications as ordered  - Instruct and encourage patient and family to use good hand hygiene technique  - Identify and instruct in appropriate isolation precautions for identified infection/condition  Outcome: Progressing  Goal: Absence of fever/infection during neutropenic period  Description  INTERVENTIONS:  - Monitor WBC  - Implement neutropenic guidelines  Outcome: Progressing     Problem: SAFETY ADULT  Goal: Patient will remain free of falls  Description  INTERVENTIONS:  - Assess patient frequently for physical needs  -  Identify cognitive and physical deficits and behaviors that affect risk of falls    -  [place] fall precautions as indicated by assessment   - Educate patient/family on patient safety including physical limitations  - Instruct patient to call for assistance with activity based on assessment  - Modify environment to reduce risk of injury  - Consider OT/PT consult to assist with strengthening/mobility  Outcome: Progressing  Goal: Maintain or return to baseline ADL function  Description  INTERVENTIONS:  -  Assess patient's ability to carry out ADLs; assess patient's baseline for ADL function and identify physical deficits which impact ability to perform ADLs (bathing, care of mouth/teeth, toileting, grooming, dressing, etc )  - Assess/evaluate cause of self-care deficits   - Assess range of motion  - Assess patient's mobility; develop plan if impaired  - Assess patient's need for assistive devices and provide as appropriate  - Encourage maximum independence but intervene and supervise when necessary  ¯ Involve family in performance of ADLs  ¯ Assess for home care needs following discharge   ¯ Request OT consult to assist with ADL evaluation and planning for discharge  ¯ Provide patient education as appropriate  Outcome: Progressing  Goal: Maintain or return mobility status to optimal level  Description  INTERVENTIONS:  - Assess patient's baseline mobility status (ambulation, transfers, stairs, etc )    - Identify cognitive and physical deficits and behaviors that affect mobility  - Identify mobility aids required to assist with transfers and/or ambulation (gait belt, sit-to-stand, lift, walker, cane, etc )  - [place] fall precautions as indicated by assessment  - Record patient progress and toleration of activity level on Mobility SBAR; progress patient to next Phase/Stage  - Instruct patient to call for assistance with activity based on assessment  - Request Rehabilitation consult to assist with strengthening/weightbearing, etc   Outcome: Progressing     Problem: Knowledge Deficit  Goal: Patient/family/caregiver demonstrates understanding of disease process, treatment plan, medications, and discharge instructions  Description  Complete learning assessment and assess knowledge base    Interventions:  - Provide teaching at level of understanding  - Provide teaching via preferred learning methods  Outcome: Progressing     Problem: DISCHARGE PLANNING  Goal: Discharge to home or other facility with appropriate resources  Description  INTERVENTIONS:  - Identify barriers to discharge w/patient and caregiver  - Arrange for needed discharge resources and transportation as appropriate  - Identify discharge learning needs (meds, wound care, etc )  - Arrange for interpretive services to assist at discharge as needed  - Refer to Case Management Department for coordinating discharge planning if the patient needs post-hospital services based on physician/advanced practitioner order or complex needs related to functional status, cognitive ability, or social support system  Outcome: Progressing     Problem: POSTPARTUM  Goal: Experiences normal postpartum course  Description  INTERVENTIONS:  - Monitor maternal vital signs  - Assess uterine involution and lochia  Outcome: Progressing  Goal: Appropriate maternal -  bonding  Description  INTERVENTIONS:  - Identify family support  - Assess for appropriate maternal/infant bonding   -Encourage maternal/infant bonding opportunities  - Referral to  or  as needed  Outcome: Progressing  Goal: Establishment of infant feeding pattern  Description  INTERVENTIONS:  - Assess breast/bottle feeding  - Refer to lactation as needed  Outcome: Progressing  Goal: Incision(s), wounds(s) or drain site(s) healing without S/S of infection  Description  INTERVENTIONS  - Assess and document risk factors for skin impairment   - Assess and document dressing, incision, wound bed, drain sites and surrounding tissue  - Initiate Nutrition services consult and/or wound management as needed  Outcome: Progressing Patient/Caregiver provided printed discharge information.

## 2019-06-20 ENCOUNTER — ANNUAL EXAM (OUTPATIENT)
Dept: OBGYN CLINIC | Facility: CLINIC | Age: 34
End: 2019-06-20
Payer: COMMERCIAL

## 2019-06-20 VITALS
BODY MASS INDEX: 28.28 KG/M2 | HEIGHT: 66 IN | DIASTOLIC BLOOD PRESSURE: 60 MMHG | SYSTOLIC BLOOD PRESSURE: 130 MMHG | WEIGHT: 176 LBS

## 2019-06-20 DIAGNOSIS — Z30.41 SURVEILLANCE OF CONTRACEPTIVE PILL: ICD-10-CM

## 2019-06-20 DIAGNOSIS — Z01.419 ENCOUNTER FOR GYNECOLOGICAL EXAMINATION (GENERAL) (ROUTINE) WITHOUT ABNORMAL FINDINGS: Primary | ICD-10-CM

## 2019-06-20 PROBLEM — O99.019 ANEMIA DURING PREGNANCY: Status: RESOLVED | Noted: 2018-12-20 | Resolved: 2019-06-20

## 2019-06-20 PROCEDURE — 99395 PREV VISIT EST AGE 18-39: CPT | Performed by: NURSE PRACTITIONER

## 2019-06-20 RX ORDER — NORGESTIMATE AND ETHINYL ESTRADIOL 7DAYSX3 LO
1 KIT ORAL DAILY
Qty: 90 TABLET | Refills: 4 | Status: SHIPPED | OUTPATIENT
Start: 2019-06-20 | End: 2020-06-23 | Stop reason: SDUPTHER

## 2020-06-23 ENCOUNTER — ANNUAL EXAM (OUTPATIENT)
Dept: OBGYN CLINIC | Facility: CLINIC | Age: 35
End: 2020-06-23
Payer: COMMERCIAL

## 2020-06-23 VITALS
BODY MASS INDEX: 27.7 KG/M2 | TEMPERATURE: 99 F | DIASTOLIC BLOOD PRESSURE: 66 MMHG | SYSTOLIC BLOOD PRESSURE: 118 MMHG | WEIGHT: 169 LBS

## 2020-06-23 DIAGNOSIS — Z30.41 SURVEILLANCE OF CONTRACEPTIVE PILL: ICD-10-CM

## 2020-06-23 DIAGNOSIS — Z01.419 ENCOUNTER FOR GYNECOLOGICAL EXAMINATION (GENERAL) (ROUTINE) WITHOUT ABNORMAL FINDINGS: Primary | ICD-10-CM

## 2020-06-23 DIAGNOSIS — Z30.09 ENCOUNTER FOR GENERAL COUNSELING AND ADVICE ON CONTRACEPTIVE MANAGEMENT: ICD-10-CM

## 2020-06-23 PROCEDURE — 99395 PREV VISIT EST AGE 18-39: CPT | Performed by: NURSE PRACTITIONER

## 2020-06-23 RX ORDER — NORGESTIMATE AND ETHINYL ESTRADIOL 7DAYSX3 LO
1 KIT ORAL DAILY
Qty: 90 TABLET | Refills: 4 | Status: SHIPPED | OUTPATIENT
Start: 2020-06-23 | End: 2021-06-29 | Stop reason: ALTCHOICE

## 2020-07-27 ENCOUNTER — NURSE TRIAGE (OUTPATIENT)
Dept: OTHER | Facility: OTHER | Age: 35
End: 2020-07-27

## 2020-07-28 ENCOUNTER — OFFICE VISIT (OUTPATIENT)
Dept: FAMILY MEDICINE CLINIC | Facility: CLINIC | Age: 35
End: 2020-07-28
Payer: COMMERCIAL

## 2020-07-28 VITALS
DIASTOLIC BLOOD PRESSURE: 76 MMHG | SYSTOLIC BLOOD PRESSURE: 110 MMHG | WEIGHT: 169 LBS | RESPIRATION RATE: 16 BRPM | HEIGHT: 66 IN | BODY MASS INDEX: 27.16 KG/M2 | HEART RATE: 52 BPM | TEMPERATURE: 97.2 F

## 2020-07-28 DIAGNOSIS — T63.444A LOCAL REACTION TO BEE STING, UNDETERMINED INTENT, INITIAL ENCOUNTER: Primary | ICD-10-CM

## 2020-07-28 PROCEDURE — 3008F BODY MASS INDEX DOCD: CPT | Performed by: FAMILY MEDICINE

## 2020-07-28 PROCEDURE — 99213 OFFICE O/P EST LOW 20 MIN: CPT | Performed by: FAMILY MEDICINE

## 2020-07-28 PROCEDURE — 1036F TOBACCO NON-USER: CPT | Performed by: FAMILY MEDICINE

## 2020-07-28 RX ORDER — PREDNISONE 20 MG/1
20 TABLET ORAL 2 TIMES DAILY WITH MEALS
Qty: 10 TABLET | Refills: 0 | Status: SHIPPED | OUTPATIENT
Start: 2020-07-28 | End: 2020-09-15 | Stop reason: ALTCHOICE

## 2020-07-28 NOTE — TELEPHONE ENCOUNTER
Regarding: Bee sting   ----- Message from Davina Fountain sent at 7/27/2020 11:19 PM EDT -----  " I have and insect bite (bee sting) it happen yesterday and today is red, swollen, hot touch, fever 100"

## 2020-07-28 NOTE — TELEPHONE ENCOUNTER
Reason for Disposition   [1] Red or very tender (to touch) area AND [2] getting larger over 48 hours after the bite    Answer Assessment - Initial Assessment Questions  1  TYPE of INSECT: "What type of insect was it?"       Bee sting  2  ONSET: "When did you get bitten?"       yesterday  3  LOCATION: "Where is the insect bite located?"       Above right knee  4  REDNESS: "Is the area red or pink?" If so, ask "What size is area of redness?" (inches or cm)  "When did the redness start?"      5-6 inches in diameter  5  PAIN: "Is there any pain?" If so, ask: "How bad is it?"  (Scale 1-10; or mild, moderate, severe)      Sore and tender  6  ITCHING: "Does it itch?" If so, ask: "How bad is the itch?"     - MILD: doesn't interfere with normal activities    - MODERATE-SEVERE: interferes with work, school, sleep, or other activities       no  7  SWELLING: "How big is the swelling?" (inches, cm, or compare to coins)      5-6 inches has grown in past few hours  8  OTHER SYMPTOMS: "Do you have any other symptoms?"  (e g , difficulty breathing, hives)      no  9   PREGNANCY: "Is there any chance you are pregnant?" "When was your last menstrual period?"      no    Protocols used: INSECT BITE-ADULT-

## 2020-09-15 ENCOUNTER — OFFICE VISIT (OUTPATIENT)
Dept: FAMILY MEDICINE CLINIC | Facility: CLINIC | Age: 35
End: 2020-09-15
Payer: COMMERCIAL

## 2020-09-15 VITALS
WEIGHT: 168.7 LBS | SYSTOLIC BLOOD PRESSURE: 110 MMHG | DIASTOLIC BLOOD PRESSURE: 70 MMHG | RESPIRATION RATE: 16 BRPM | BODY MASS INDEX: 27.11 KG/M2 | HEIGHT: 66 IN | TEMPERATURE: 97.7 F | HEART RATE: 68 BPM

## 2020-09-15 DIAGNOSIS — Z00.00 ANNUAL PHYSICAL EXAM: ICD-10-CM

## 2020-09-15 DIAGNOSIS — Z23 NEED FOR VACCINATION: ICD-10-CM

## 2020-09-15 PROCEDURE — 3725F SCREEN DEPRESSION PERFORMED: CPT | Performed by: PHYSICIAN ASSISTANT

## 2020-09-15 PROCEDURE — 99395 PREV VISIT EST AGE 18-39: CPT | Performed by: PHYSICIAN ASSISTANT

## 2020-09-15 PROCEDURE — 90471 IMMUNIZATION ADMIN: CPT

## 2020-09-15 PROCEDURE — 1036F TOBACCO NON-USER: CPT | Performed by: PHYSICIAN ASSISTANT

## 2020-09-15 PROCEDURE — 90686 IIV4 VACC NO PRSV 0.5 ML IM: CPT

## 2020-09-15 NOTE — PATIENT INSTRUCTIONS

## 2020-09-15 NOTE — PROGRESS NOTES
BMI Counseling: Body mass index is 27 65 kg/m²  The BMI is above normal  Nutrition recommendations include reducing portion sizes, decreasing overall calorie intake and 3-5 servings of fruits/vegetables daily  Exercise recommendations include moderate aerobic physical activity for 150 minutes/week  ADULT ANNUAL PHYSICAL  [facility]    NAME: Michael Mathew  AGE: 30 y o  SEX: female  : 1985     DATE: 9/15/2020     Assessment and Plan:     Healthy 30year old female    Immunizations and preventive care screenings were discussed with patient today  Appropriate education was printed on patient's after visit summary  Counseling:  Alcohol/drug use: discussed moderation in alcohol intake, the recommendations for healthy alcohol use, and avoidance of illicit drug use  Dental Health: discussed importance of regular tooth brushing, flossing, and dental visits  Injury prevention: discussed safety/seat belts, safety helmets, smoke detectors, carbon dioxide detectors, and smoking near bedding or upholstery  Sexual health: discussed sexually transmitted diseases, partner selection, use of condoms, avoidance of unintended pregnancy, and contraceptive alternatives  · Exercise: the importance of regular exercise/physical activity was discussed  Recommend exercise 3-5 times per week for at least 30 minutes  Return in 1 year (on 9/15/2021)  Chief Complaint:     Chief Complaint   Patient presents with    Physical Exam      History of Present Illness:     Adult Annual Physical   Patient here for a comprehensive physical exam  The patient reports no problems  Diet and Physical Activity  · Diet/Nutrition: well balanced diet  · Exercise: walking, moderate cardiovascular exercise and 1-2 times a week on average        Depression Screening  PHQ-9 Depression Screening    PHQ-9:    Frequency of the following problems over the past two weeks: General Health  · Sleep: sleeps well and gets 7-8 hours of sleep on average  · Hearing: normal - bilateral   · Vision: no vision problems  · Dental: regular dental visits and brushes teeth twice daily  /GYN Health  · Last menstrual period: on OCP regular  · Contraceptive method: oral contraceptives  Review of Systems:     Review of Systems   Constitutional: Negative  HENT: Negative  Eyes: Negative  Respiratory: Negative  Cardiovascular: Negative  Gastrointestinal: Negative  Endocrine: Negative  Genitourinary: Negative  Musculoskeletal: Negative  Skin: Negative  Allergic/Immunologic: Negative  Neurological: Negative  Hematological: Negative  Psychiatric/Behavioral: Negative         Past Medical History:     Past Medical History:   Diagnosis Date    ACL (anterior cruciate ligament) tear     Anemia     During pregnancy    Varicella     As child      Past Surgical History:     Past Surgical History:   Procedure Laterality Date    UT KNEE SCOPE,AID ANT CRUCIATE REPAIR Right 9/15/2017    Procedure: KNEE ARTHROSCOPY WITH ANTERIOR CRUCIATE LIGAMENT RECONSTRUCTION USING HAMSTRING AUTOGRAFT;  Surgeon: Kristina De La O MD;  Location: [facility];  Service: Orthopedics    WISDOM TOOTH EXTRACTION        Social History:        Social History     Socioeconomic History    Marital status: /Civil Union     Spouse name: None    Number of children: 2    Years of education: None    Highest education level: None   Occupational History    None   Social Needs    Financial resource strain: None    Food insecurity     Worry: None     Inability: None    Transportation needs     Medical: None     Non-medical: None   Tobacco Use    Smoking status: Never Smoker    Smokeless tobacco: Never Used    Tobacco comment: No tobaccco/smoke exposure   Substance and Sexual Activity    Alcohol use: Yes     Frequency: 2-3 times a week     Drinks per session: 1 or 2     Binge frequency: Weekly    Drug use: No    Sexual activity: Yes     Partners: Male     Birth control/protection: OCP   Lifestyle    Physical activity     Days per week: None     Minutes per session: None    Stress: None   Relationships    Social connections     Talks on phone: None     Gets together: None     Attends Gnosticist service: None     Active member of club or organization: None     Attends meetings of clubs or organizations: None     Relationship status: None    Intimate partner violence     Fear of current or ex partner: None     Emotionally abused: None     Physically abused: None     Forced sexual activity: None   Other Topics Concern    None   Social History Narrative    Always uses seat belt    Caffeine Use    Drinks coffee    Feels safe at home    Uses Safety Equipmentd    Lives with spouse    Denied: History of Travel      Family History:     Family History   Problem Relation Age of Onset    Hypertension Mother     Other Father         Hepatic Failure    Cancer Maternal Grandmother         brst    Heart disease Maternal Grandfather         MI    No Known Problems Paternal Grandmother     No Known Problems Paternal Grandfather     No Known Problems Brother     No Known Problems Daughter     No Known Problems Son     Alcohol abuse Neg Hx     Substance Abuse Neg Hx     Mental illness Neg Hx       Current Medications:     Current Outpatient Medications   Medication Sig Dispense Refill    Ascorbic Acid (VITAMIN C PO) Take by mouth      norgestimate-ethinyl estradiol (ORTHO TRI-CYCLEN LO) 0 18/0 215/0 25 MG-25 MCG per tablet Take 1 tablet by mouth daily 90 tablet 4    Prenatal MV-Min-Fe Fum-FA-DHA (PRENATAL+DHA PO) Take by mouth       No current facility-administered medications for this visit  Allergies:      Allergies   Allergen Reactions    Amoxicillin Hives      Physical Exam:     /70 (BP Location: Left arm, Patient Position: Sitting, Cuff Size: Standard) Pulse 68   Temp 97 7 °F (36 5 °C) (Temporal)   Resp 16   Ht 5' 5 5" (1 664 m)   Wt 76 5 kg (168 lb 11 2 oz)   Breastfeeding No   BMI 27 65 kg/m²     Physical Exam  Constitutional:       Appearance: Normal appearance  She is well-developed and normal weight  HENT:      Head: Normocephalic and atraumatic  Right Ear: Hearing, tympanic membrane, ear canal and external ear normal       Left Ear: Hearing, tympanic membrane, ear canal and external ear normal       Nose: Nose normal       Mouth/Throat:      Mouth: Mucous membranes are moist       Pharynx: Oropharynx is clear  Uvula midline  Eyes:      Extraocular Movements: Extraocular movements intact  Conjunctiva/sclera: Conjunctivae normal       Pupils: Pupils are equal, round, and reactive to light  Neck:      Musculoskeletal: Normal range of motion and neck supple  Thyroid: No thyromegaly  Cardiovascular:      Rate and Rhythm: Normal rate and regular rhythm  Heart sounds: Normal heart sounds  No murmur  Pulmonary:      Effort: Pulmonary effort is normal       Breath sounds: Normal breath sounds  Abdominal:      General: Abdomen is flat  Bowel sounds are normal  There is no distension  Palpations: Abdomen is soft  There is no mass  Tenderness: There is no abdominal tenderness  Musculoskeletal: Normal range of motion  Lymphadenopathy:      Cervical: No cervical adenopathy  Skin:     General: Skin is warm  Neurological:      General: No focal deficit present  Mental Status: She is alert and oriented to person, place, and time  Cranial Nerves: No cranial nerve deficit  Deep Tendon Reflexes: Reflexes normal    Psychiatric:         Mood and Affect: Mood normal          Behavior: Behavior normal          Thought Content:  Thought content normal          Judgment: Judgment normal           KALEE Julio

## 2020-09-21 ENCOUNTER — TELEPHONE (OUTPATIENT)
Dept: OBGYN CLINIC | Facility: CLINIC | Age: 35
End: 2020-09-21

## 2020-09-21 NOTE — TELEPHONE ENCOUNTER
We discussed IUD when I saw her last but if she is not interested in this she can certainly trial a different OCP brand  Her current brand is already low dose but she could try loloestrin instead

## 2020-09-21 NOTE — TELEPHONE ENCOUNTER
----- Message from Jessi Johnson sent at 9/19/2020  5:35 PM EDT -----  Regarding: Prescription Question  Contact: [phone]  Edinson Burch,     I'm still getting migraines during my third week  Is it possible to try a pill with lower hormone levels?      Thanks,     Kemal Antonio

## 2020-09-21 NOTE — TELEPHONE ENCOUNTER
Pt is having migraines without aura usually around the 3rd week of her pill pack, is wondering if she should switch her pill

## 2020-09-23 DIAGNOSIS — Z30.09 GENERAL COUNSELING AND ADVICE FOR CONTRACEPTIVE MANAGEMENT: Primary | ICD-10-CM

## 2020-09-23 NOTE — TELEPHONE ENCOUNTER
Pt contacted and advised as directed  Pt stated she is still considering the IUD but not interested at this point in time  Pt agrees with provider to try loloestrin instead and confirmed [facility] on file it may be sent to today  I advised pt will be done by end of business day today  Pt was grateful

## 2021-01-07 DIAGNOSIS — Z30.09 GENERAL COUNSELING AND ADVICE FOR CONTRACEPTIVE MANAGEMENT: ICD-10-CM

## 2021-01-11 RX ORDER — NORETHINDRONE ACETATE AND ETHINYL ESTRADIOL, ETHINYL ESTRADIOL AND FERROUS FUMARATE 1MG-10(24)
KIT ORAL
Qty: 84 TABLET | Refills: 4 | OUTPATIENT
Start: 2021-01-11

## 2021-03-26 DIAGNOSIS — Z23 ENCOUNTER FOR IMMUNIZATION: ICD-10-CM

## 2021-06-29 ENCOUNTER — ANNUAL EXAM (OUTPATIENT)
Dept: OBGYN CLINIC | Facility: CLINIC | Age: 36
End: 2021-06-29
Payer: COMMERCIAL

## 2021-06-29 VITALS
WEIGHT: 172.6 LBS | HEIGHT: 65 IN | SYSTOLIC BLOOD PRESSURE: 100 MMHG | DIASTOLIC BLOOD PRESSURE: 64 MMHG | BODY MASS INDEX: 28.76 KG/M2

## 2021-06-29 DIAGNOSIS — Z01.419 ENCOUNTER FOR GYNECOLOGICAL EXAMINATION (GENERAL) (ROUTINE) WITHOUT ABNORMAL FINDINGS: Primary | ICD-10-CM

## 2021-06-29 DIAGNOSIS — Z12.31 ENCOUNTER FOR SCREENING MAMMOGRAM FOR MALIGNANT NEOPLASM OF BREAST: ICD-10-CM

## 2021-06-29 DIAGNOSIS — Z30.41 SURVEILLANCE OF CONTRACEPTIVE PILL: ICD-10-CM

## 2021-06-29 DIAGNOSIS — Z80.3 FAMILY HISTORY OF BREAST CANCER: ICD-10-CM

## 2021-06-29 PROCEDURE — 99395 PREV VISIT EST AGE 18-39: CPT | Performed by: NURSE PRACTITIONER

## 2021-06-29 PROCEDURE — 1036F TOBACCO NON-USER: CPT | Performed by: NURSE PRACTITIONER

## 2021-06-29 PROCEDURE — 3008F BODY MASS INDEX DOCD: CPT | Performed by: NURSE PRACTITIONER

## 2021-06-29 NOTE — PROGRESS NOTES
Assessment/Plan:    Surveillance of contraceptive pill  The patient likes current OCP  She denies ACHES and desires to continue  Refill provided  Reviewed reasons to call  Encounter for gynecological examination (general) (routine) without abnormal findings  Normal findings on routine gyn exam  Advised monthly SBE, annual CBE and reviewed benefits of baseline screening mammo - she is interested in this and order was provided today  Reviewed ASCCP guidelines and recommended pap with cotesting q3 yrs for this low risk patient; this was noted to be up to date  STI testing was offered and the patient declines; she reports low risk  The patient desires to continue current contraceptive method ([address])  Diet/activity recommendations:  Encouraged daily Ca++ and vitamin D intake as well as daily weight bearing exercise for promotion of bone health  RTO in one year or sooner PRN  Family history of breast cancer  Family history notable for mgm with breast ca, diagnosed in her 76s  Discussed benefits of early screening mammo and Dayday Singh does express interest in this  Recommended that she calls her insurance co to confirm coverage prior to scheduling  Diagnoses and all orders for this visit:    Encounter for gynecological examination (general) (routine) without abnormal findings    Surveillance of contraceptive pill  -     Norethin-Eth Estrad-Fe Biphas 1 MG-10 MCG / 10 MCG TABS; Take 1 tablet by mouth daily    Family history of breast cancer  -     Mammo screening bilateral w 3d & cad; Future    Encounter for screening mammogram for malignant neoplasm of breast  -     Mammo screening bilateral w 3d & cad; Future          Subjective:      Patient ID: Kelly Shannon is a 30 y o  female  This patient presents for routine annual gyn exam    Medically stable  Withdrawal bleeds are light and reg on APOLONIA  Headaches have resolved since transition to [facility] - she is not sure if stress was an exacerbating factor as well   Denies ACHES and desires to continue  She denies acute gyn complaints  She denies pelvic pain, breast concerns, abnormal discharge, bowel/bladder dysfunction, depression/anxiety   and monogamous  She denies STI concerns  Kids are well  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        The following portions of the patient's history were reviewed and updated as appropriate: allergies, current medications, past family history, past medical history, past social history, past surgical history and problem list     Review of Systems   Constitutional: Negative  Respiratory: Negative  Cardiovascular: Negative  Gastrointestinal: Negative  Genitourinary: Negative  Musculoskeletal: Negative  Skin: Negative  Neurological: Negative  Psychiatric/Behavioral: Negative  Objective:      /64 (BP Location: Right arm, Patient Position: Sitting, Cuff Size: Standard)   Ht 5' 5 25" (1 657 m)   Wt 78 3 kg (172 lb 9 6 oz)   LMP 06/10/2021   BMI 28 50 kg/m²          Physical Exam  Constitutional:       Appearance: She is well-developed  HENT:      Head: Normocephalic and atraumatic  Eyes:      Pupils: Pupils are equal, round, and reactive to light  Neck:      Thyroid: No thyromegaly  Cardiovascular:      Rate and Rhythm: Normal rate and regular rhythm  Heart sounds: Normal heart sounds  Pulmonary:      Effort: Pulmonary effort is normal  No respiratory distress  Breath sounds: Normal breath sounds  No wheezing or rales  Chest:      Chest wall: No mass, deformity or tenderness  Breasts: Breasts are symmetrical          Right: No inverted nipple, mass, nipple discharge, skin change or tenderness  Left: No inverted nipple, mass, nipple discharge, skin change or tenderness  Abdominal:      General: There is no distension  Palpations: Abdomen is soft  There is no hepatomegaly, splenomegaly or mass  Tenderness: There is no abdominal tenderness   There is no guarding or rebound  Genitourinary:     Labia:         Right: No rash, tenderness, lesion or injury  Left: No rash, tenderness, lesion or injury  Vagina: Normal  No foreign body  No vaginal discharge, erythema, tenderness or bleeding  Cervix: No cervical motion tenderness, discharge or friability  Uterus: Normal        Adnexa:         Right: No mass, tenderness or fullness  Left: No mass, tenderness or fullness  Rectum: Normal    Musculoskeletal:         General: Normal range of motion  Cervical back: Normal range of motion and neck supple  Lymphadenopathy:      Cervical: No cervical adenopathy  Skin:     General: Skin is warm and dry  Findings: No rash  Nails: There is no clubbing  Neurological:      Mental Status: She is alert and oriented to person, place, and time  Cranial Nerves: No cranial nerve deficit  Psychiatric:         Speech: Speech normal          Behavior: Behavior normal          Thought Content:  Thought content normal          Judgment: Judgment normal

## 2021-06-29 NOTE — ASSESSMENT & PLAN NOTE
Normal findings on routine gyn exam  Advised monthly SBE, annual CBE and reviewed benefits of baseline screening mammo - she is interested in this and order was provided today  Reviewed ASCCP guidelines and recommended pap with cotesting q3 yrs for this low risk patient; this was noted to be up to date  STI testing was offered and the patient declines; she reports low risk  The patient desires to continue current contraceptive method ([facility])  Diet/activity recommendations:  Encouraged daily Ca++ and vitamin D intake as well as daily weight bearing exercise for promotion of bone health  RTO in one year or sooner PRN

## 2021-06-29 NOTE — ASSESSMENT & PLAN NOTE
Family history notable for mgm with breast ca, diagnosed in her 76s  Discussed benefits of early screening mammo and Catherine Leon does express interest in this  Recommended that she calls her insurance co to confirm coverage prior to scheduling

## 2021-12-13 DIAGNOSIS — Z30.41 SURVEILLANCE OF CONTRACEPTIVE PILL: ICD-10-CM

## 2022-03-30 ENCOUNTER — OFFICE VISIT (OUTPATIENT)
Dept: FAMILY MEDICINE CLINIC | Facility: CLINIC | Age: 37
End: 2022-03-30
Payer: COMMERCIAL

## 2022-03-30 VITALS
DIASTOLIC BLOOD PRESSURE: 70 MMHG | HEIGHT: 65 IN | WEIGHT: 168.5 LBS | OXYGEN SATURATION: 100 % | HEART RATE: 80 BPM | RESPIRATION RATE: 15 BRPM | SYSTOLIC BLOOD PRESSURE: 120 MMHG | BODY MASS INDEX: 28.07 KG/M2 | TEMPERATURE: 98.4 F

## 2022-03-30 DIAGNOSIS — B34.9 VIRAL ILLNESS: ICD-10-CM

## 2022-03-30 DIAGNOSIS — Z00.00 ROUTINE ADULT HEALTH MAINTENANCE: ICD-10-CM

## 2022-03-30 DIAGNOSIS — J04.0 LARYNGITIS: Primary | ICD-10-CM

## 2022-03-30 DIAGNOSIS — J02.9 SORE THROAT: ICD-10-CM

## 2022-03-30 LAB
S PYO AG THROAT QL: NEGATIVE
SARS-COV-2 AG UPPER RESP QL IA: NEGATIVE
VALID CONTROL: NORMAL

## 2022-03-30 PROCEDURE — 87880 STREP A ASSAY W/OPTIC: CPT | Performed by: PHYSICIAN ASSISTANT

## 2022-03-30 PROCEDURE — 87811 SARS-COV-2 COVID19 W/OPTIC: CPT | Performed by: PHYSICIAN ASSISTANT

## 2022-03-30 PROCEDURE — 99213 OFFICE O/P EST LOW 20 MIN: CPT | Performed by: PHYSICIAN ASSISTANT

## 2022-03-30 PROCEDURE — 3725F SCREEN DEPRESSION PERFORMED: CPT | Performed by: PHYSICIAN ASSISTANT

## 2022-03-30 PROCEDURE — 1036F TOBACCO NON-USER: CPT | Performed by: PHYSICIAN ASSISTANT

## 2022-03-30 PROCEDURE — 3008F BODY MASS INDEX DOCD: CPT | Performed by: PHYSICIAN ASSISTANT

## 2022-03-30 PROCEDURE — 87070 CULTURE OTHR SPECIMN AEROBIC: CPT | Performed by: PHYSICIAN ASSISTANT

## 2022-03-30 NOTE — LETTER
March 30, 2022     Patient: Dayana Hernandez   YOB: 1985   Date of Visit: 3/30/2022       To Whom it May Concern:    Dayana Hernandez is under my professional care  She was seen in my office on 3/30/2022  She may return to work on 4/4/2022  If you have any questions or concerns, please don't hesitate to call           Sincerely,          Chasidy Hernandez PA-C        CC: No Recipients

## 2022-03-30 NOTE — PROGRESS NOTES
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 and had no voice  Today still with laryngitis but not sore thraot  Denies fever, chills, nasal congestion  Does not feel sick  Has been drinking tea to help with throat  Covid infection in November  Sore Throat   This is a new problem  The current episode started yesterday  The problem has been gradually worsening  The pain is worse on the left side  There has been no fever  The pain is at a severity of 3/10  Associated symptoms include coughing, a hoarse voice and trouble swallowing  Pertinent negatives include no abdominal pain, congestion, diarrhea, drooling, ear discharge, ear pain, headaches, plugged ear sensation, neck pain, shortness of breath, stridor, swollen glands or vomiting  She has had no exposure to strep or mono         The following portions of the patient's history were reviewed and updated as appropriate: allergies, current medications, past family history, past medical history, past social history, past surgical history and problem list     Past Medical History:   Diagnosis Date    ACL (anterior cruciate ligament) tear     Anemia     During pregnancy    Varicella     As child     Past Surgical History:   Procedure Laterality Date    [place] KNEE SCOPE,AID ANT CRUCIATE REPAIR Right 9/15/2017    Procedure: KNEE ARTHROSCOPY WITH ANTERIOR CRUCIATE LIGAMENT RECONSTRUCTION USING HAMSTRING AUTOGRAFT;  Surgeon: Kristina De La O MD;  [facility];  Service: Orthopedics    WISDOM TOOTH EXTRACTION       Family History   Problem Relation Age of Onset    Hypertension Mother     Diabetes Mother     Other Father         Hepatic Failure    Cancer Maternal Grandmother         brst    Heart disease Maternal Grandfather         MI    No Known Problems Paternal Grandmother     No Known Problems Paternal Grandfather     No Known Problems Brother     No Known Problems Daughter     No Known Problems Son     Alcohol abuse Neg Hx     Substance Abuse Neg Hx     Mental illness Neg Hx      Social History     Socioeconomic History    Marital status: /Civil Union     Spouse name: Not on file    Number of children: 3    Years of education: Not on file    Highest education level: Not on file   Occupational History    Not on file   Tobacco Use    Smoking status: Never Smoker    Smokeless tobacco: Never Used    Tobacco comment: No tobaccco/smoke exposure   Vaping Use    Vaping Use: Never used   Substance and Sexual Activity    Alcohol use:  Yes    Drug use: No    Sexual activity: Yes     Partners: Male     Birth control/protection: OCP   Other Topics Concern    Not on file   Social History Narrative    Always uses seat belt    Caffeine Use    Drinks coffee    Feels safe at home    Uses Safety Equipmentd    Lives with spouse    Denied: History of Travel     Social Determinants of Health     Financial Resource Strain: Not on file   Food Insecurity: Not on file   Transportation Needs: Not on file   Physical Activity: Not on file   Stress: Not on file   Social Connections: Not on file   Intimate Partner Violence: Not on file   Housing Stability: Not on file       Current Outpatient Medications:     Ascorbic Acid (VITAMIN C PO), Take by mouth, Disp: , Rfl:     Norethin-Eth Estrad-Fe Biphas 1 MG-10 MCG / 10 MCG TABS, Take 1 tablet by mouth daily, Disp: 90 tablet, Rfl: 2   Prenatal MV-Min-Fe Fum-FA-DHA (PRENATAL+DHA PO), Take by mouth (Patient not taking: Reported on 6/29/2021), Disp: , Rfl:     Review of Systems   HENT: Positive for hoarse voice, sore throat and trouble swallowing  Negative for congestion, drooling, ear discharge and ear pain  Respiratory: Positive for cough  Negative for shortness of breath and stridor  Gastrointestinal: Negative for abdominal pain, diarrhea and vomiting  Musculoskeletal: Negative for neck pain  Neurological: Negative for headaches  Objective:    Vitals:    03/30/22 1342   BP: 120/70   Pulse: 80   Resp: 15   Temp: 98 4 °F (36 9 °C)   TempSrc: Oral   SpO2: 100%   Weight: 76 4 kg (168 lb 8 oz)   Height: 5' 5 25" (1 657 m)        Physical Exam  Constitutional:       General: She is not in acute distress  Appearance: She is well-developed  She is not ill-appearing or toxic-appearing  HENT:      Head: Normocephalic and atraumatic  Right Ear: Tympanic membrane, ear canal and external ear normal       Left Ear: Tympanic membrane, ear canal and external ear normal       Nose: Mucosal edema, congestion and rhinorrhea present  Mouth/Throat:      Mouth: Mucous membranes are moist       Pharynx: Oropharynx is clear  No oropharyngeal exudate or posterior oropharyngeal erythema  Tonsils: No tonsillar exudate or tonsillar abscesses  Eyes:      Conjunctiva/sclera: Conjunctivae normal       Pupils: Pupils are equal, round, and reactive to light  Cardiovascular:      Rate and Rhythm: Normal rate and regular rhythm  Heart sounds: Normal heart sounds  Pulmonary:      Effort: Pulmonary effort is normal       Breath sounds: Normal breath sounds  Musculoskeletal:      Cervical back: Normal range of motion and neck supple  Lymphadenopathy:      Cervical: No cervical adenopathy  Skin:     General: Skin is warm  Neurological:      General: No focal deficit present        Mental Status: She is alert and oriented to person, place, and time     Psychiatric:         Mood and Affect: Mood normal          Behavior: Behavior normal          Judgment: Judgment normal

## 2022-04-01 LAB — BACTERIA THROAT CULT: NORMAL

## 2022-07-17 NOTE — PROGRESS NOTES
Patient feeling movement  It's a boy! Will for for 28 week labs next week  Had flu shot  Will consider Tdap next visit in 3 weeks  Some back pain but not concerned - reassured re: aches/pains of third trimester  No

## 2022-09-01 ENCOUNTER — TELEPHONE (OUTPATIENT)
Dept: OBGYN CLINIC | Facility: CLINIC | Age: 37
End: 2022-09-01

## 2022-09-01 DIAGNOSIS — Z30.41 SURVEILLANCE OF CONTRACEPTIVE PILL: ICD-10-CM

## 2022-09-01 RX ORDER — NORETHINDRONE ACETATE AND ETHINYL ESTRADIOL, ETHINYL ESTRADIOL AND FERROUS FUMARATE 1MG-10(24)
KIT ORAL
Qty: 84 TABLET | Refills: 2 | OUTPATIENT
Start: 2022-09-01

## 2022-11-07 NOTE — PROGRESS NOTES
Assessment   40 y o  [id number] presenting for annual exam      Plan   Diagnoses and all orders for this visit:    Encntr for gyn exam (general) (routine) w/o abn findings  -     Liquid-based pap, screening        Pap collected today  Fhx breast cancer- MGM in 76s and paternal aunt in her 62s  States she was offered early screening mammo last year with her annual  Reviewed option of ordering this today but may want to call insurance to determine coverage since not a first degree relative and >50 at onset of diagnosis       SBE encouraged, A yearly mammogram is recommended for breast cancer screening starting at age 36  ASCCP guidelines reviewed  sues, all concerns & questions addressed  See provided information in your after visit summary     F/U Annually and PRN    Results will be released to [facility], if abnormal will call or message to review and discuss treatment plan      __________________________________________________________________    Subjective     Genesis Hernandez is a 40 y o  [id number] presenting for annual exam  She is without complaint  SCREENING  Last Pap: 3/2019 neg/neg    GYN  Periods are absent on lo loestrin    Sexually active: Yes - single partner - male  Contraception: OCPs    Hx Abnormal pap: denies  We reviewed ASCCP guidelines for Pap testing today  Denies vaginal discharge, itching, odor, dyspareunia, pelvic pain and vulvar/vaginal symptoms      OB           Complaints: denies   Denies urgency, frequency, hematuria, leakage / change in stream, difficulty urinating  BREAST  Complaints: denies   Denies: breast lump, breast tenderness, nipple discharge, skin color change, and skin lesion(s)      Pertinent Family Hx:   Family hx of breast cancer: MGM (76s); Paternal aunt (61s)  Family hx of ovarian cancer: no  Family hx of colon cancer: no      GENERAL  PMH reviewed/updated and is as below  Patient does follow with a PCP      SOCIAL  Smoking: no  Alcohol:yes  Drug: no  Occupation: teacher at [employer]       Past Medical History:   Diagnosis Date   • ACL (anterior cruciate ligament) tear    • Anemia     During pregnancy   • Varicella     As child       Past Surgical History:   Procedure Laterality Date   • [place] KNEE SCOPE,AID ANT CRUCIATE REPAIR Right 9/15/2017    Procedure: KNEE ARTHROSCOPY WITH ANTERIOR CRUCIATE LIGAMENT RECONSTRUCTION USING HAMSTRING AUTOGRAFT;  Surgeon: Shireen Lawson MD;  [facility];  Service: Orthopedics   • WISDOM TOOTH EXTRACTION           Current Outpatient Medications:   •  Ascorbic Acid (VITAMIN C PO), Take by mouth, Disp: , Rfl:   •  Norethin-Eth Estrad-Fe Biphas 1 MG-10 MCG / 10 MCG TABS, Take 1 tablet by mouth daily, Disp: 90 tablet, Rfl: 0  •  Prenatal MV-Min-Fe Fum-FA-DHA (PRENATAL+DHA PO), Take by mouth (Patient not taking: Reported on 6/29/2021), Disp: , Rfl:     Allergies   Allergen Reactions   • Amoxicillin Hives       Social History     Socioeconomic History   • Marital status: /Civil Union     Spouse name: Not on file   • Number of children: 3   • Years of education: Not on file   • Highest education level: Not on file   Occupational History   • Not on file   Tobacco Use   • Smoking status: Never Smoker   • Smokeless tobacco: Never Used   • Tobacco comment: No tobaccco/smoke exposure   Vaping Use   • Vaping Use: Never used   Substance and Sexual Activity   • Alcohol use:  Yes   • Drug use: No   • Sexual activity: Yes     Partners: Male     Birth control/protection: OCP   Other Topics Concern   • Not on file   Social History Narrative    Always uses seat belt    Caffeine Use    Drinks coffee    Feels safe at home    Uses Safety Equipmentd    Lives with spouse    Denied: History of Travel     Social Determinants of Health     Financial Resource Strain: Not on file   Food Insecurity: Not on file   Transportation Needs: Not on file   Physical Activity: Not on file   Stress: Not on file   Social Connections: Not on file Intimate Partner Violence: Not on file   Housing Stability: Not on file       Review of Systems     ROS:  Constitutional: Negative for fatigue and unexpected weight change  Respiratory: Negative for cough and shortness of breath  Cardiovascular: Negative for chest pain and palpitations  Gastrointestinal: Negative for abdominal pain and change in bowel habits  Breasts:  Negative, other than as noted above  Genitourinary: Negative, other than as noted above  Psychiatric: Negative for mood difficulties  Objective         Vitals:    11/08/22 1613   BP: 110/72       Physical Examination:    Patient appears well and is not in distress  Neck is supple without masses, no cervical or supraclavicular lymphadenopathy  Cardiovascular: regular rate and rhythm; no murmurs  Lungs: clear to auscultation bilaterally; no wheezes  Breasts are symmetrical without mass, tenderness, nipple discharge, skin changes or adenopathy  Abdomen is soft and nontender without masses  External genitals are normal without lesions or rashes  Urethral meatus and urethra are normal  Bladder is normal to palpation  Vagina is normal without discharge or bleeding  Cervix is normal without discharge or lesion  Uterus is normal, mobile, nontender without palpable mass  Adnexa are normal, nontender, without palpable mass

## 2022-11-08 ENCOUNTER — ANNUAL EXAM (OUTPATIENT)
Dept: OBGYN CLINIC | Facility: CLINIC | Age: 37
End: 2022-11-08

## 2022-11-08 VITALS
WEIGHT: 176 LBS | BODY MASS INDEX: 29.32 KG/M2 | DIASTOLIC BLOOD PRESSURE: 72 MMHG | HEIGHT: 65 IN | SYSTOLIC BLOOD PRESSURE: 110 MMHG

## 2022-11-08 DIAGNOSIS — Z30.41 SURVEILLANCE OF CONTRACEPTIVE PILL: ICD-10-CM

## 2022-11-08 DIAGNOSIS — Z12.31 SCREENING MAMMOGRAM FOR BREAST CANCER: ICD-10-CM

## 2022-11-08 DIAGNOSIS — Z01.419 ENCNTR FOR GYN EXAM (GENERAL) (ROUTINE) W/O ABN FINDINGS: Primary | ICD-10-CM

## 2022-11-10 LAB
HPV HR 12 DNA CVX QL NAA+PROBE: NEGATIVE
HPV16 DNA CVX QL NAA+PROBE: NEGATIVE
HPV18 DNA CVX QL NAA+PROBE: POSITIVE

## 2022-11-12 LAB
LAB AP GYN PRIMARY INTERPRETATION: NORMAL
Lab: NORMAL

## 2022-11-15 ENCOUNTER — TELEPHONE (OUTPATIENT)
Dept: OBGYN CLINIC | Facility: CLINIC | Age: 37
End: 2022-11-15

## 2022-11-15 NOTE — TELEPHONE ENCOUNTER
Please call this patient  Let her know her pap smear is negative but she did test + for 18 HPV  The recommended next step is colposcopy  Please relay this to the patient and help her to get scheduled for this

## 2022-11-15 NOTE — TELEPHONE ENCOUNTER
Per comm consent lm to pt with results and recommendations of pap from [facility]

## 2023-01-25 ENCOUNTER — PROCEDURE VISIT (OUTPATIENT)
Dept: OBGYN CLINIC | Facility: CLINIC | Age: 38
End: 2023-01-25

## 2023-01-25 VITALS
HEIGHT: 65 IN | DIASTOLIC BLOOD PRESSURE: 74 MMHG | SYSTOLIC BLOOD PRESSURE: 112 MMHG | WEIGHT: 182 LBS | BODY MASS INDEX: 30.32 KG/M2

## 2023-01-25 DIAGNOSIS — Z32.02 PREGNANCY TEST NEGATIVE: ICD-10-CM

## 2023-01-25 DIAGNOSIS — B97.7 HIGH RISK HPV INFECTION: Primary | ICD-10-CM

## 2023-01-25 LAB — SL AMB POCT URINE HCG: NORMAL

## 2023-01-25 NOTE — PROGRESS NOTES
Assessment/Plan:       Problem List Items Addressed This Visit    None  Visit Diagnoses     High risk HPV infection    -  Primary    Relevant Orders    Tissue Exam    Colposcopy    Pregnancy test negative        Relevant Orders    POCT urine HCG        - etiology, progression, management of cervical dysplasia reviewed, with diagrams  - impression CIN1  - discussed that, with CIN1 on biopsies, would continue Pap  With OVI 2-3, would recommend excision     Will follow up via phone/mychart regarding results      Subjective:      Patient ID: Kiersten Castillo is a 40 y o  female  HPI  She presents today for colposcopy for HPV18  The following portions of the patient's history were reviewed and updated as appropriate: allergies, current medications, past family history, past medical history, past social history, past surgical history and problem list     Review of Systems  neg as reviewed    Objective:  /74 (BP Location: Left arm, Patient Position: Sitting, Cuff Size: Standard)   Ht 5' 5 25" (1 657 m)   Wt 82 6 kg (182 lb)   BMI 30 05 kg/m²      Physical Exam  Vitals reviewed  Constitutional:       Appearance: She is well-developed  HENT:      Head: Normocephalic  Cardiovascular:      Rate and Rhythm: Normal rate  Pulmonary:      Effort: Pulmonary effort is normal    Abdominal:      General: There is no distension  Palpations: Abdomen is soft  Tenderness: There is no abdominal tenderness  There is no guarding or rebound  Genitourinary:     General: Normal vulva  Exam position: Lithotomy position  Vagina: No bleeding  Cervix: No cervical motion tenderness  Comments: Yellow = TZ  Pink = acetowhite changes  Red = punctation  Musculoskeletal:         General: Normal range of motion  Cervical back: Normal range of motion  Skin:     General: Skin is warm and dry  Neurological:      Mental Status: She is alert and oriented to person, place, and time  Psychiatric:         Behavior: Behavior normal

## 2023-01-25 NOTE — PROGRESS NOTES
Colposcopy     Date/Time 1/25/2023 12:08 PM     Universal Protocol   Consent: Verbal consent obtained  Risks and benefits: risks, benefits and alternatives were discussed  Consent given by: patient  Time out: Immediately prior to procedure a "time out" was called to verify the correct patient, procedure, equipment, support staff and site/side marked as required  Patient understanding: patient states understanding of the procedure being performed  Procedure consent: procedure consent matches procedure scheduled  Test results: test results available and properly labeled  Required items: required blood products, implants, devices, and special equipment available  Patient identity confirmed: verbally with patient       Performed by  [address], MD     Authorized by [address], MD        Pre-procedure details     Pre-procedure timeout performed: yes      Prepped with: acetic acid       Indication    Indications: HPV18  Procedure Details   Procedure: Colposcopy w/ cervical biopsy and ECC      Whatley speculum was placed in the vagina: yes      Under colposcopic examination the transition zone was seen in entirety: yes      Endocervix was curetted using a Kevorkian curette: yes      Cervical biopsy performed with a cervical biopsy punch: yes      Monsel's solution was applied: yes      Biopsy(s): yes      Location:  2, 5, ECC    Specimen to pathology: yes       Post-procedure      Findings: Punctation and White epithelium      Impression: Low grade cervical dysplasia      Patient tolerance of procedure:   Tolerated well, no immediate complications

## 2023-01-31 ENCOUNTER — OFFICE VISIT (OUTPATIENT)
Dept: FAMILY MEDICINE CLINIC | Facility: CLINIC | Age: 38
End: 2023-01-31

## 2023-01-31 VITALS
WEIGHT: 180 LBS | OXYGEN SATURATION: 99 % | HEIGHT: 66 IN | BODY MASS INDEX: 28.93 KG/M2 | HEART RATE: 74 BPM | SYSTOLIC BLOOD PRESSURE: 110 MMHG | RESPIRATION RATE: 16 BRPM | DIASTOLIC BLOOD PRESSURE: 70 MMHG

## 2023-01-31 DIAGNOSIS — F43.23 SITUATIONAL MIXED ANXIETY AND DEPRESSIVE DISORDER: Primary | ICD-10-CM

## 2023-01-31 DIAGNOSIS — F43.21 SITUATIONAL DEPRESSION: ICD-10-CM

## 2023-01-31 RX ORDER — ESCITALOPRAM OXALATE 10 MG/1
10 TABLET ORAL DAILY
Qty: 90 TABLET | Refills: 3 | Status: SHIPPED | OUTPATIENT
Start: 2023-01-31

## 2023-01-31 NOTE — PROGRESS NOTES
Assessment/Plan:    1  Situational depression/anxiety - will try lexapro 10 mg starting with taking 5 mg daily for 1 week then increasing, continue with therapist, healthy lifestyle encouraged    F/u as needed  F/u 8 weeks      Subjective:   Chief Complaint   Patient presents with   • Anxiety   • Depression      Patient ID: Jeremías Turcios is a 40 y o  female  Patient here following up regarding depression and anxiety  Has been following with a therapist for some time now at [facility] space and just struggling since winter began  Trouble with going through the day to day motions, crying more  Therapist recommend trying a medication, SSRI either lexapro or zoloft         The following portions of the patient's history were reviewed and updated as appropriate: allergies, current medications, past family history, past medical history, past social history, past surgical history and problem list     Past Medical History:   Diagnosis Date   • ACL (anterior cruciate ligament) tear    • Anemia     During pregnancy   • Varicella     As child     Past Surgical History:   Procedure Laterality Date   • [place] ARTHRS AIDED ANT CRUCIATE LIGM RPR/AGMNTJ/RCNSTJ Right 9/15/2017    Procedure: KNEE ARTHROSCOPY WITH ANTERIOR CRUCIATE LIGAMENT RECONSTRUCTION USING HAMSTRING AUTOGRAFT;  Surgeon: Mika Marroquin MD;  [facility];  Service: Orthopedics   • WISDOM TOOTH EXTRACTION       Family History   Problem Relation Age of Onset   • Hypertension Mother    • Diabetes Mother    • Other Father         Hepatic Failure   • Cancer Maternal Grandmother         brst   • Heart disease Maternal Grandfather         MI   • No Known Problems Paternal Grandmother    • No Known Problems Paternal Grandfather    • No Known Problems Brother    • No Known Problems Daughter    • No Known Problems Son    • Alcohol abuse Neg Hx    • Substance Abuse Neg Hx    • Mental illness Neg Hx      Social History     Socioeconomic History   • Marital status: /Civil [employer]     Spouse name: Not on file   • Number of children: 3   • Years of education: Not on file   • Highest education level: Not on file   Occupational History   • Not on file   Tobacco Use   • Smoking status: Never   • Smokeless tobacco: Never   • Tobacco comments:     No tobaccco/smoke exposure   Vaping Use   • Vaping Use: Never used   Substance and Sexual Activity   • Alcohol use: Yes     Comment: socially   • Drug use: No   • Sexual activity: Yes     Partners: Male     Birth control/protection: OCP   Other Topics Concern   • Not on file   Social History Narrative    Always uses seat belt    Caffeine Use    Drinks coffee    Feels safe at home    Uses Safety Equipmentd    Lives with spouse    Denied: History of Travel     Social Determinants of Health     Financial Resource Strain: Not on file   Food Insecurity: Not on file   Transportation Needs: Not on file   Physical Activity: Not on file   Stress: Not on file   Social Connections: Not on file   Intimate Partner Violence: Not on file   Housing Stability: Not on file       Current Outpatient Medications:   •  Norethin-Eth Estrad-Fe Biphas 1 MG-10 MCG / 10 MCG TABS, Take 1 tablet by mouth daily, Disp: 90 tablet, Rfl: 3  •  Ascorbic Acid (VITAMIN C PO), Take by mouth (Patient not taking: Reported on 1/25/2023), Disp: , Rfl:     Review of Systems          Objective:    Vitals:    01/31/23 1543   BP: 110/70   BP Location: Left arm   Patient Position: Sitting   Cuff Size: Standard   Pulse: 74   Resp: 16   SpO2: 99%   Weight: 81 6 kg (180 lb)   Height: 5' 5 75" (1 67 m)        Physical Exam  Constitutional:       Appearance: Normal appearance  She is well-developed and normal weight  HENT:      Head: Normocephalic and atraumatic  Musculoskeletal:         General: Normal range of motion  Right lower leg: No edema  Neurological:      General: No focal deficit present  Mental Status: She is alert and oriented to person, place, and time  Psychiatric:         Mood and Affect: Mood normal          Behavior: Behavior normal          Thought Content: Thought content normal          Judgment: Judgment normal          I have spent 35 minutes with Patient  today in which greater than 50% of this time was spent in counseling/coordination of care regarding Risks and benefits of tx options, Intructions for management, Patient and family education, Importance of tx compliance, Risk factor reductions and Impressions

## 2023-02-01 ENCOUNTER — TELEPHONE (OUTPATIENT)
Dept: OBGYN CLINIC | Facility: CLINIC | Age: 38
End: 2023-02-01

## 2023-03-28 ENCOUNTER — OFFICE VISIT (OUTPATIENT)
Dept: FAMILY MEDICINE CLINIC | Facility: CLINIC | Age: 38
End: 2023-03-28

## 2023-03-28 VITALS
DIASTOLIC BLOOD PRESSURE: 68 MMHG | HEART RATE: 62 BPM | SYSTOLIC BLOOD PRESSURE: 104 MMHG | WEIGHT: 182 LBS | HEIGHT: 64 IN | OXYGEN SATURATION: 98 % | RESPIRATION RATE: 16 BRPM | BODY MASS INDEX: 31.07 KG/M2

## 2023-03-28 DIAGNOSIS — F43.21 SITUATIONAL DEPRESSION: Primary | ICD-10-CM

## 2023-03-28 DIAGNOSIS — F43.23 SITUATIONAL MIXED ANXIETY AND DEPRESSIVE DISORDER: ICD-10-CM

## 2023-03-28 DIAGNOSIS — Z00.00 ROUTINE ADULT HEALTH MAINTENANCE: ICD-10-CM

## 2023-03-28 NOTE — PROGRESS NOTES
Assessment/Plan:    1  Situational anxiety/depression - great success with lexapro, will continue on 10 mg with healthy lifestyle    F/u physical in summer with labs  F/u as needed    Subjective:   Chief Complaint   Patient presents with   • Anxiety   • Depression      Patient ID: Bossman Bravo is a 40 y o  female  Patient feeling great, feeling like she did prior to having her first child  No negative side effects  Happy with results  Would like to continue        The following portions of the patient's history were reviewed and updated as appropriate: allergies, current medications, past family history, past medical history, past social history, past surgical history and problem list     Past Medical History:   Diagnosis Date   • ACL (anterior cruciate ligament) tear    • Anemia     During pregnancy   • Varicella     As child     Past Surgical History:   Procedure Laterality Date   • [place] ARTHRS AIDED ANT CRUCIATE LIGM RPR/AGMNTJ/RCNSTJ Right 9/15/2017    Procedure: KNEE ARTHROSCOPY WITH ANTERIOR CRUCIATE LIGAMENT RECONSTRUCTION USING HAMSTRING AUTOGRAFT;  Surgeon: Milton Davis MD;  [facility];  Service: Orthopedics   • WISDOM TOOTH EXTRACTION       Family History   Problem Relation Age of Onset   • Hypertension Mother    • Diabetes Mother    • Other Father         Hepatic Failure   • Cancer Maternal Grandmother         brst   • Heart disease Maternal Grandfather         MI   • No Known Problems Paternal Grandmother    • No Known Problems Paternal Grandfather    • No Known Problems Brother    • No Known Problems Daughter    • No Known Problems Son    • Alcohol abuse Neg Hx    • Substance Abuse Neg Hx    • Mental illness Neg Hx      Social History     Socioeconomic History   • Marital status: /Civil Union     Spouse name: Not on file   • Number of children: 3   • Years of education: Not on file   • Highest education level: Not on file   Occupational History   • Not on file   Tobacco Use   • "Smoking status: Never   • Smokeless tobacco: Never   • Tobacco comments:     No tobaccco/smoke exposure   Vaping Use   • Vaping Use: Never used   Substance and Sexual Activity   • Alcohol use: Yes     Comment: socially   • Drug use: No   • Sexual activity: Yes     Partners: Male     Birth control/protection: OCP   Other Topics Concern   • Not on file   Social History Narrative    Always uses seat belt    Caffeine Use    Drinks coffee    Feels safe at home    Uses Safety Equipmentd    Lives with spouse    Denied: History of Travel     Social Determinants of Health     Financial Resource Strain: Not on file   Food Insecurity: Not on file   Transportation Needs: Not on file   Physical Activity: Not on file   Stress: Not on file   Social Connections: Not on file   Intimate Partner Violence: Not on file   Housing Stability: Not on file       Current Outpatient Medications:   •  escitalopram (Lexapro) 10 mg tablet, Take 1 tablet (10 mg total) by mouth daily, Disp: 90 tablet, Rfl: 3  •  Norethin-Eth Estrad-Fe Biphas 1 MG-10 MCG / 10 MCG TABS, Take 1 tablet by mouth daily, Disp: 90 tablet, Rfl: 3  •  Ascorbic Acid (VITAMIN C PO), Take by mouth (Patient not taking: Reported on 1/25/2023), Disp: , Rfl:     Review of Systems          Objective:    Vitals:    03/28/23 1541   BP: 104/68   BP Location: Left arm   Patient Position: Sitting   Cuff Size: Standard   Pulse: 62   Resp: 16   SpO2: 98%   Weight: 82 6 kg (182 lb)   Height: 5' 3 5\" (1 613 m)        Physical Exam  Constitutional:       Appearance: Normal appearance  She is normal weight  HENT:      Head: Normocephalic and atraumatic  Skin:     General: Skin is warm  Neurological:      General: No focal deficit present  Mental Status: She is alert and oriented to person, place, and time  Psychiatric:         Mood and Affect: Mood normal          Behavior: Behavior normal          Thought Content:  Thought content normal          Judgment: Judgment normal        "

## 2023-07-17 ENCOUNTER — APPOINTMENT (OUTPATIENT)
Dept: LAB | Facility: CLINIC | Age: 38
End: 2023-07-17
Payer: COMMERCIAL

## 2023-07-17 DIAGNOSIS — Z00.00 ROUTINE ADULT HEALTH MAINTENANCE: ICD-10-CM

## 2023-07-17 LAB
ALBUMIN SERPL BCP-MCNC: 3.5 G/DL (ref 3.5–5)
ALP SERPL-CCNC: 53 U/L (ref 46–116)
ALT SERPL W P-5'-P-CCNC: 30 U/L (ref 12–78)
ANION GAP SERPL CALCULATED.3IONS-SCNC: 3 MMOL/L
AST SERPL W P-5'-P-CCNC: 19 U/L (ref 5–45)
BASOPHILS # BLD AUTO: 0.05 THOUSANDS/ÂΜL (ref 0–0.1)
BASOPHILS NFR BLD AUTO: 1 % (ref 0–1)
BILIRUB SERPL-MCNC: 0.58 MG/DL (ref 0.2–1)
BUN SERPL-MCNC: 17 MG/DL (ref 5–25)
CALCIUM SERPL-MCNC: 8.8 MG/DL (ref 8.3–10.1)
CHLORIDE SERPL-SCNC: 110 MMOL/L (ref 96–108)
CHOLEST SERPL-MCNC: 143 MG/DL
CO2 SERPL-SCNC: 25 MMOL/L (ref 21–32)
CREAT SERPL-MCNC: 1.04 MG/DL (ref 0.6–1.3)
EOSINOPHIL # BLD AUTO: 0.41 THOUSAND/ÂΜL (ref 0–0.61)
EOSINOPHIL NFR BLD AUTO: 7 % (ref 0–6)
ERYTHROCYTE [DISTWIDTH] IN BLOOD BY AUTOMATED COUNT: 13 % (ref 11.6–15.1)
GFR SERPL CREATININE-BSD FRML MDRD: 68 ML/MIN/1.73SQ M
GLUCOSE P FAST SERPL-MCNC: 90 MG/DL (ref 65–99)
HCT VFR BLD AUTO: 39.3 % (ref 34.8–46.1)
HDLC SERPL-MCNC: 66 MG/DL
HGB BLD-MCNC: 12.4 G/DL (ref 11.5–15.4)
IMM GRANULOCYTES # BLD AUTO: 0.02 THOUSAND/UL (ref 0–0.2)
IMM GRANULOCYTES NFR BLD AUTO: 0 % (ref 0–2)
LDLC SERPL CALC-MCNC: 60 MG/DL (ref 0–100)
LYMPHOCYTES # BLD AUTO: 2.45 THOUSANDS/ÂΜL (ref 0.6–4.47)
LYMPHOCYTES NFR BLD AUTO: 39 % (ref 14–44)
MCH RBC QN AUTO: 28.8 PG (ref 26.8–34.3)
MCHC RBC AUTO-ENTMCNC: 31.6 G/DL (ref 31.4–37.4)
MCV RBC AUTO: 91 FL (ref 82–98)
MONOCYTES # BLD AUTO: 0.59 THOUSAND/ÂΜL (ref 0.17–1.22)
MONOCYTES NFR BLD AUTO: 10 % (ref 4–12)
NEUTROPHILS # BLD AUTO: 2.72 THOUSANDS/ÂΜL (ref 1.85–7.62)
NEUTS SEG NFR BLD AUTO: 43 % (ref 43–75)
NRBC BLD AUTO-RTO: 0 /100 WBCS
PLATELET # BLD AUTO: 278 THOUSANDS/UL (ref 149–390)
PMV BLD AUTO: 10.8 FL (ref 8.9–12.7)
POTASSIUM SERPL-SCNC: 4 MMOL/L (ref 3.5–5.3)
PROT SERPL-MCNC: 7 G/DL (ref 6.4–8.4)
RBC # BLD AUTO: 4.31 MILLION/UL (ref 3.81–5.12)
SODIUM SERPL-SCNC: 138 MMOL/L (ref 135–147)
TRIGL SERPL-MCNC: 84 MG/DL
TSH SERPL DL<=0.05 MIU/L-ACNC: 1.69 UIU/ML (ref 0.45–4.5)
WBC # BLD AUTO: 6.24 THOUSAND/UL (ref 4.31–10.16)

## 2023-07-17 PROCEDURE — 36415 COLL VENOUS BLD VENIPUNCTURE: CPT

## 2023-07-17 PROCEDURE — 84443 ASSAY THYROID STIM HORMONE: CPT

## 2023-07-17 PROCEDURE — 85025 COMPLETE CBC W/AUTO DIFF WBC: CPT

## 2023-07-17 PROCEDURE — 80053 COMPREHEN METABOLIC PANEL: CPT

## 2023-07-17 PROCEDURE — 80061 LIPID PANEL: CPT

## 2023-07-25 ENCOUNTER — OFFICE VISIT (OUTPATIENT)
Dept: FAMILY MEDICINE CLINIC | Facility: CLINIC | Age: 38
End: 2023-07-25
Payer: COMMERCIAL

## 2023-07-25 VITALS
SYSTOLIC BLOOD PRESSURE: 112 MMHG | WEIGHT: 183 LBS | BODY MASS INDEX: 30.49 KG/M2 | TEMPERATURE: 98.8 F | OXYGEN SATURATION: 98 % | HEART RATE: 50 BPM | RESPIRATION RATE: 16 BRPM | HEIGHT: 65 IN | DIASTOLIC BLOOD PRESSURE: 70 MMHG

## 2023-07-25 DIAGNOSIS — Z00.00 ANNUAL PHYSICAL EXAM: Primary | ICD-10-CM

## 2023-07-25 DIAGNOSIS — Z23 IMMUNIZATION DUE: ICD-10-CM

## 2023-07-25 PROCEDURE — 99395 PREV VISIT EST AGE 18-39: CPT

## 2023-07-25 PROCEDURE — 90651 9VHPV VACCINE 2/3 DOSE IM: CPT

## 2023-07-25 PROCEDURE — 90471 IMMUNIZATION ADMIN: CPT

## 2023-07-25 NOTE — PROGRESS NOTES
ADULT ANNUAL PHYSICAL  [id number] [facility]    NAME: Lindsey Burnett  AGE: 41 y.o. SEX: female  : 1985     DATE: 2023     Assessment and Plan:     Healthy 41year old female    Immunizations and preventive care screenings were discussed with patient today. Appropriate education was printed on patient's after visit summary. Counseling:  Alcohol/drug use: discussed moderation in alcohol intake, the recommendations for healthy alcohol use, and avoidance of illicit drug use. Dental Health: discussed importance of regular tooth brushing, flossing, and dental visits. Injury prevention: discussed safety/seat belts, safety helmets, smoke detectors, carbon dioxide detectors, and smoking near bedding or upholstery. Sexual health: discussed sexually transmitted diseases, partner selection, use of condoms, avoidance of unintended pregnancy, and contraceptive alternatives. · Exercise: the importance of regular exercise/physical activity was discussed. Recommend exercise 3-5 times per week for at least 30 minutes. Return in 1 year (on 2024). Chief Complaint:     Chief Complaint   Patient presents with   • Physical Exam      History of Present Illness:     Adult Annual Physical   Patient here for a comprehensive physical exam. The patient reports no problems. Diet and Physical Activity  · Diet/Nutrition: well balanced diet. · Exercise: moderate cardiovascular exercise. Depression Screening  PHQ-2/9 Depression Screening    Little interest or pleasure in doing things: 0 - not at all  Feeling down, depressed, or hopeless: 0 - not at all  PHQ-2 Score: 0  PHQ-2 Interpretation: Negative depression screen       General Health  · Sleep: sleeps well. · Hearing: normal - bilateral.  · Vision: no vision problems. · Dental: regular dental visits.        /GYN Health  · Last menstrual period: regular on OCP  · Contraceptive method: oral contraceptives. · History of STDs?: no.     Review of Systems:     Review of Systems   Constitutional: Negative. HENT: Negative. Eyes: Negative. Respiratory: Negative. Cardiovascular: Negative. Gastrointestinal: Negative. Endocrine: Negative. Genitourinary: Negative. Musculoskeletal: Negative. Skin: Negative. Allergic/Immunologic: Negative. Neurological: Negative. Hematological: Negative. Psychiatric/Behavioral: Negative.        Past Medical History:     Past Medical History:   Diagnosis Date   • ACL (anterior cruciate ligament) tear    • Anemia     During pregnancy   • Varicella     As child      Past Surgical History:     Past Surgical History:   Procedure Laterality Date   • [place] ARTHRS AIDED ANT CRUCIATE LIGM RPR/AGMNTJ/RCNSTJ Right 9/15/2017    Procedure: KNEE ARTHROSCOPY WITH ANTERIOR CRUCIATE LIGAMENT RECONSTRUCTION USING HAMSTRING AUTOGRAFT;  Surgeon: Windsor Homans, MD;  Location: [facility];  Service: Orthopedics   • WISDOM TOOTH EXTRACTION        Social History:     Social History     Socioeconomic History   • Marital status: /Civil Union     Spouse name: None   • Number of children: 3   • Years of education: None   • Highest education level: None   Occupational History   • None   Tobacco Use   • Smoking status: Never   • Smokeless tobacco: Never   • Tobacco comments:     No tobaccco/smoke exposure   Vaping Use   • Vaping Use: Never used   Substance and Sexual Activity   • Alcohol use: Yes     Comment: socially   • Drug use: No   • Sexual activity: Yes     Partners: Male     Birth control/protection: OCP   Other Topics Concern   • None   Social History Narrative    Always uses seat belt    Caffeine Use    Drinks coffee    Feels safe at home    Uses Safety Equipmentd    Lives with spouse    Denied: History of Travel     Social Determinants of Health     Financial Resource Strain: Not on file   Food Insecurity: Not on file   Transportation Needs: Not on file   Physical Activity: Not on file   Stress: Not on file   Social Connections: Not on file   Intimate Partner Violence: Not on file   Housing Stability: Not on file      Family History:     Family History   Problem Relation Age of Onset   • Hypertension Mother    • Diabetes Mother    • Other Father         Hepatic Failure   • Cancer Maternal Grandmother         brst   • Heart disease Maternal Grandfather         MI   • No Known Problems Paternal Grandmother    • No Known Problems Paternal Grandfather    • No Known Problems Brother    • No Known Problems Daughter    • No Known Problems Son    • Alcohol abuse Neg Hx    • Substance Abuse Neg Hx    • Mental illness Neg Hx       Current Medications:     Current Outpatient Medications   Medication Sig Dispense Refill   • escitalopram (Lexapro) 10 mg tablet Take 1 tablet (10 mg total) by mouth daily 90 tablet 3   • Norethin-Eth Estrad-Fe Biphas 1 MG-10 MCG / 10 MCG TABS Take 1 tablet by mouth daily 90 tablet 3     No current facility-administered medications for this visit. Allergies: Allergies   Allergen Reactions   • Amoxicillin Hives      Physical Exam:     /70   Pulse (!) 50   Temp 98.8 °F (37.1 °C) (Oral)   Resp 16   Ht 5' 5.25" (1.657 m)   Wt 83 kg (183 lb)   SpO2 98%   BMI 30.22 kg/m²     Physical Exam  Constitutional:       Appearance: Normal appearance. She is well-developed and normal weight. HENT:      Head: Normocephalic and atraumatic. Right Ear: Hearing normal.      Left Ear: Hearing normal.      Mouth/Throat:      Pharynx: Uvula midline. Eyes:      Extraocular Movements: Extraocular movements intact. Conjunctiva/sclera: Conjunctivae normal.      Pupils: Pupils are equal, round, and reactive to light. Neck:      Thyroid: No thyromegaly. Cardiovascular:      Rate and Rhythm: Normal rate and regular rhythm. Pulses: Normal pulses. Heart sounds: Normal heart sounds. No murmur heard.   Pulmonary:      Effort: Pulmonary effort is normal.      Breath sounds: Normal breath sounds. Abdominal:      General: Abdomen is flat. Bowel sounds are normal. There is no distension. Palpations: Abdomen is soft. There is no mass. Tenderness: There is no abdominal tenderness. Musculoskeletal:         General: Normal range of motion. Cervical back: Normal range of motion and neck supple. Lymphadenopathy:      Cervical: No cervical adenopathy. Skin:     General: Skin is warm. Neurological:      General: No focal deficit present. Mental Status: She is alert and oriented to person, place, and time. Cranial Nerves: No cranial nerve deficit. Deep Tendon Reflexes: Reflexes normal.   Psychiatric:         Mood and Affect: Mood normal.         Behavior: Behavior normal.         Thought Content:  Thought content normal.         Judgment: Judgment normal.          Kedar Oakes PA-C   [address]

## 2023-09-28 ENCOUNTER — CLINICAL SUPPORT (OUTPATIENT)
Dept: FAMILY MEDICINE CLINIC | Facility: CLINIC | Age: 38
End: 2023-09-28
Payer: COMMERCIAL

## 2023-09-28 DIAGNOSIS — Z23 NEED FOR VACCINATION: Primary | ICD-10-CM

## 2023-09-28 PROCEDURE — 90471 IMMUNIZATION ADMIN: CPT

## 2023-09-28 PROCEDURE — 90651 9VHPV VACCINE 2/3 DOSE IM: CPT

## 2023-11-14 ENCOUNTER — ANNUAL EXAM (OUTPATIENT)
Dept: OBGYN CLINIC | Facility: CLINIC | Age: 38
End: 2023-11-14
Payer: COMMERCIAL

## 2023-11-14 VITALS
SYSTOLIC BLOOD PRESSURE: 110 MMHG | DIASTOLIC BLOOD PRESSURE: 70 MMHG | HEIGHT: 65 IN | BODY MASS INDEX: 31.32 KG/M2 | WEIGHT: 188 LBS

## 2023-11-14 DIAGNOSIS — Z30.41 SURVEILLANCE OF CONTRACEPTIVE PILL: ICD-10-CM

## 2023-11-14 DIAGNOSIS — Z01.419 ENCNTR FOR GYN EXAM (GENERAL) (ROUTINE) W/O ABN FINDINGS: Primary | ICD-10-CM

## 2023-11-14 PROCEDURE — 99395 PREV VISIT EST AGE 18-39: CPT | Performed by: PHYSICIAN ASSISTANT

## 2023-11-14 PROCEDURE — G0476 HPV COMBO ASSAY CA SCREEN: HCPCS | Performed by: PHYSICIAN ASSISTANT

## 2023-11-14 PROCEDURE — G0145 SCR C/V CYTO,THINLAYER,RESCR: HCPCS | Performed by: PHYSICIAN ASSISTANT

## 2023-11-14 NOTE — PROGRESS NOTES
Patient presents for a routine annual visit  Last Pap Smear- 2022   Pap today   HPV vaccine- completed   LMP- no periods   Birth control- pill    nonsmoker  Currently sexually active - one partner   Declines STD testing  No family history of uterine, ovarian, cervical or breast cancer  No concerns/questions for today's visit

## 2023-11-14 NOTE — PROGRESS NOTES
Assessment   41 y.o. [id number] presenting for annual exam.     Plan   Diagnoses and all orders for this visit:    Encntr for gyn exam (general) (routine) w/o abn findings  -     Liquid-based pap, screening    Surveillance of contraceptive pill  -     Norethin-Eth Estrad-Fe Biphas 1 MG-10 MCG / 10 MCG TABS; Take one tablet orally daily, skipping placebo pills        Pap collected today  Fhx breast cancer - elected to have early mammogram screening which she has scheduled. Has called insurance who will cover this    Perineal hygiene reviewed. Weight bearing exercises minium of 150 mins/weekly advised. Kegel exercises recommended. SBE encouraged, A yearly mammogram is recommended for breast cancer screening starting at age 36. ASCCP guidelines reviewed. Condoms encouraged with all sexual activity to prevent STI's. Gardisil vaccines recommended up to age 39. Calcium/ Vit D dietary requirements discussed. Advised to call with any issues, all concerns & questions addressed. See provided information in your after visit summary     F/U Annually and PRN    Results will be released to [facility], if abnormal will call or message to review and discuss treatment plan.     __________________________________________________________________    Subjective     Bart Ruiz is a 41 y.o. [id number] presenting for annual exam.      SCREENING  Last Pap: 2022 neg/+18 HPV; colpo 2023 OVI 1    Pap Hx  2016 pap- neg/neg  3/2019 pap- neg/neg  2022 pap- negative/+18 HPV  2023 colpo- CIN1      GYN  She is amenorrheic on lo loestrin    Sexually active: Yes - single partner - male  Contraception: OCPs    Hx Abnormal pap: see above  We reviewed ASCCP guidelines for Pap testing today. Gardasil: She has completed the Gardasil series.     Denies vaginal discharge, itching, odor, dyspareunia, pelvic pain and vulvar/vaginal symptoms      OB         Complaints: denies   Denies urgency, frequency, hematuria, leakage / change in stream, difficulty urinating. BREAST  Complaints: denies   Denies: breast lump, breast tenderness, nipple discharge, skin color change, and skin lesion(s)      Pertinent Family Hx:   Family hx of breast cancer: MGM (76s); Paternal aunt (61s)   Family hx of ovarian cancer: no  Family hx of colon cancer: no      GENERAL  PMH reviewed/updated and is as below. Patient does follow with a PCP.     SOCIAL  Smoking: no  Alcohol:social  Drug: no  Occupation:teacher at [employer]      Past Medical History:   Diagnosis Date    ACL (anterior cruciate ligament) tear     Anemia     During pregnancy    Varicella     As child       Past Surgical History:   Procedure Laterality Date    [place] ARTHRS AIDED ANT CRUCIATE LIGM RPR/AGMNTJ/RCNSTJ Right 9/15/2017    Procedure: KNEE ARTHROSCOPY WITH ANTERIOR CRUCIATE LIGAMENT RECONSTRUCTION USING HAMSTRING AUTOGRAFT;  Surgeon: Erika Garcia MD;  Location: [facility];  Service: Orthopedics    WISDOM TOOTH EXTRACTION           Current Outpatient Medications:     escitalopram (Lexapro) 10 mg tablet, Take 1 tablet (10 mg total) by mouth daily, Disp: 90 tablet, Rfl: 3    Norethin-Eth Estrad-Fe Biphas 1 MG-10 MCG / 10 MCG TABS, Take 1 tablet by mouth daily, Disp: 90 tablet, Rfl: 3    Allergies   Allergen Reactions    Amoxicillin Hives       Social History     Socioeconomic History    Marital status: /Civil Union     Spouse name: Not on file    Number of children: 3    Years of education: Not on file    Highest education level: Not on file   Occupational History    Not on file   Tobacco Use    Smoking status: Never    Smokeless tobacco: Never    Tobacco comments:     No tobaccco/smoke exposure   Vaping Use    Vaping Use: Never used   Substance and Sexual Activity    Alcohol use: Yes     Comment: socially    Drug use: No    Sexual activity: Yes     Partners: Male     Birth control/protection: OCP   Other Topics Concern    Not on file   Social History Narrative    Always uses seat belt    Caffeine Use    Drinks coffee    Feels safe at home    Uses Safety Equipmentd    Lives with spouse    Denied: History of Travel     Social Determinants of Health     Financial Resource Strain: Not on file   Food Insecurity: Not on file   Transportation Needs: Not on file   Physical Activity: Not on file   Stress: Not on file   Social Connections: Not on file   Intimate Partner Violence: Not on file   Housing Stability: Not on file       Review of Systems     ROS:  Constitutional: Negative for fatigue and unexpected weight change. Respiratory: Negative for cough and shortness of breath. Cardiovascular: Negative for chest pain and palpitations. Gastrointestinal: Negative for abdominal pain and change in bowel habits  Breasts:  Negative, other than as noted above. Genitourinary: Negative, other than as noted above. Psychiatric: Negative for mood difficulties. Objective      /70 (BP Location: Left arm, Patient Position: Sitting, Cuff Size: Standard)   Ht 5' 5.25" (1.657 m)   Wt 85.3 kg (188 lb)   LMP  (LMP Unknown)   BMI 31.05 kg/m²     Physical Examination:    Patient appears well and is not in distress  Neck is supple without masses, no cervical or supraclavicular lymphadenopathy  Cardiovascular: regular rate and rhythm; no murmurs  Lungs: clear to auscultation bilaterally; no wheezes  Breasts are symmetrical without mass, tenderness, nipple discharge, skin changes or adenopathy. Abdomen is soft and nontender without masses. External genitals are normal without lesions or rashes. Urethral meatus and urethra are normal  Bladder is normal to palpation  Vagina is normal without discharge or bleeding. Cervix is normal without discharge or lesion. Uterus is normal, mobile, nontender without palpable mass. Adnexa are normal, nontender, without palpable mass.

## 2023-11-21 LAB
LAB AP GYN PRIMARY INTERPRETATION: NORMAL
Lab: NORMAL

## 2023-11-22 ENCOUNTER — TELEPHONE (OUTPATIENT)
Dept: OBGYN CLINIC | Facility: CLINIC | Age: 38
End: 2023-11-22

## 2023-11-22 NOTE — TELEPHONE ENCOUNTER
----- Message from Sarah Weber PA-C sent at 11/22/2023  7:24 AM EST -----  Please call this patient and let her know her pap smear again showed +HPV18. She will need to return to the office for another colposcopy.  Please help to facilitate scheduling

## 2023-11-27 ENCOUNTER — HOSPITAL ENCOUNTER (OUTPATIENT)
Dept: MAMMOGRAPHY | Facility: HOSPITAL | Age: 38
Discharge: HOME | End: 2023-11-27
Payer: COMMERCIAL

## 2023-11-27 VITALS — BODY MASS INDEX: 31.33 KG/M2 | HEIGHT: 65 IN | WEIGHT: 188.05 LBS

## 2023-11-27 DIAGNOSIS — Z12.31 SCREENING MAMMOGRAM FOR BREAST CANCER: ICD-10-CM

## 2023-11-27 PROCEDURE — 77063 BREAST TOMOSYNTHESIS BI: CPT

## 2023-11-27 PROCEDURE — 77067 SCR MAMMO BI INCL CAD: CPT

## 2024-01-03 PROBLEM — B97.7 HIGH RISK HPV INFECTION: Status: ACTIVE | Noted: 2024-01-03

## 2024-01-03 PROBLEM — N87.0 DYSPLASIA OF CERVIX, LOW GRADE (CIN 1): Status: ACTIVE | Noted: 2024-01-03

## 2024-01-03 NOTE — PROGRESS NOTES
Assessment/Plan:    Colposcopy compatible with OVI-1  Pap smear was NILM. Wide cervical eversion posteriorly, may represent a sampling error      12/16 ,  3/19 NILM, neg HR          ?  Increased Co-testing frequency  11/22 NILM , + HR 18 HPV - CIN1 on Colpo  12/23  same           She is unsure why the Pap smears were more frequently performed vs. guidelines          G3, P3   Never smoker.  to Dariusz 14 yrs.    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 female.    Mrs. Antonio presents today for a colposcopy.  Her second in 2 years.  The Pap smear was normal but she has persistent high risk HPV 18.  Last year OVI-1 was found after having a normal Pap smear with high risk HPV 18.          The following portions of the patient's history were reviewed and updated as appropriate: She  has a past medical history of ACL (anterior cruciate ligament) tear, Anemia, and Varicella.  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  Her family history includes Breast cancer (age of onset: 65) in her paternal aunt; Cancer in her maternal grandmother; Diabetes in her mother; Heart disease in her maternal grandfather; Hypertension in her mother; No Known Problems in her brother, daughter,  "maternal aunt, maternal aunt, maternal aunt, paternal aunt, paternal grandfather, paternal grandmother, son, and son; Other in her father.  She  reports that she has never smoked. She has never used smokeless tobacco. She reports current alcohol use. She reports that she does not use drugs.  Current Outpatient Medications   Medication Sig Dispense Refill    escitalopram (Lexapro) 10 mg tablet Take 1 tablet (10 mg total) by mouth daily 90 tablet 3    Norethin-Eth Estrad-Fe Biphas 1 MG-10 MCG / 10 MCG TABS Take one tablet orally daily, skipping placebo pills 84 tablet 4     No current facility-administered medications for this visit.     Current Outpatient Medications on File Prior to Visit   Medication Sig    escitalopram (Lexapro) 10 mg tablet Take 1 tablet (10 mg total) by mouth daily    Norethin-Eth Estrad-Fe Biphas 1 MG-10 MCG / 10 MCG TABS Take one tablet orally daily, skipping placebo pills     No current facility-administered medications on file prior to visit.     She is allergic to amoxicillin..    Review of Systems   Constitutional: Negative.          Objective:    Vitals:    01/04/24 0906   BP: 118/70   BP Location: Left arm   Patient Position: Sitting   Cuff Size: Standard   Weight: 85.7 kg (189 lb)            Physical Exam  Genitourinary:     General: Normal vulva.      Exam position: Lithotomy position.            Comments: Mild rectocele present.  Wide cervical eversion posteriorly.  With numerous nabothian cyst throughout the area of squamous metaplasia.  Acetowhite epithelium at 5-7 o'clock, 11:00 and 2:00.  Changes compatible with OVI-1.           Colposcopy     Date/Time  1/4/2024 9:20 AM     [place] Protocol   Consent: Verbal consent obtained.  Risks and benefits: risks, benefits and alternatives were discussed  Consent given by: patient  Time out: Immediately prior to procedure a \"time out\" was called to verify the correct patient, procedure, equipment, support staff and site/side marked as " required.  Patient understanding: patient states understanding of the procedure being performed  Patient consent: the patient's understanding of the procedure matches consent given  Patient identity confirmed: verbally with patient     Performed by  Norman Dietz MD   Authorized by  Norman Dietz MD     Pre-procedure details      Pre-procedure timeout performed: yes      Premeds:  Ibuprofen    Prepped with: acetic acid     Indication    Indications: Persistent high risk HPV 18, recent OVI-1.   Procedure Details   Procedure: Colposcopy w/ cervical biopsy and ECC      Under satisfactory analgesia the patient was prepped and draped in the dorsal lithotomy position: yes      Smithwick speculum was placed in the vagina: yes      Under colposcopic examination the transition zone was seen in entirety: yes      Endocervix was curetted using a Kevorkian curette: yes      Cervical biopsy performed with a cervical biopsy punch: yes      Monsel's solution was applied: yes      Biopsy(s): yes      Location:  5,7,11,2   Post-procedure      Findings: White epithelium      Impression: Low grade cervical dysplasia     Comments       Patient was interviewed.  Process was explained.  This was performed last year as well.  After informed consent patient was placed in the dorsolithotomy position.  A Graves speculum was placed and colposcopy was performed.  There are no lesions present before the application of acetic acid.  There was a wide area of cervical eversion posteriorly gland openings were seen throughout the inferior aspect of this indicating squamous metaplasia.  Mild acetowhite changes were present from 5-7 o'clock, 11:00 and 2:00.  Biopsies were taken at all 4 sites followed by an ECC.  A moderate amount of bleeding was present from the biopsy sites.  Monsel's was used for hemostasis.  Procedure was well-tolerated.  Instructions and precautions were given.

## 2024-01-04 ENCOUNTER — PROCEDURE VISIT (OUTPATIENT)
Dept: OBGYN CLINIC | Facility: CLINIC | Age: 39
End: 2024-01-04
Payer: COMMERCIAL

## 2024-01-04 VITALS — SYSTOLIC BLOOD PRESSURE: 118 MMHG | BODY MASS INDEX: 31.21 KG/M2 | WEIGHT: 189 LBS | DIASTOLIC BLOOD PRESSURE: 70 MMHG

## 2024-01-04 DIAGNOSIS — N87.0 DYSPLASIA OF CERVIX, LOW GRADE (CIN 1): Primary | ICD-10-CM

## 2024-01-04 DIAGNOSIS — N86: ICD-10-CM

## 2024-01-04 DIAGNOSIS — B97.7 HIGH RISK HPV INFECTION: ICD-10-CM

## 2024-01-04 PROCEDURE — 88305 TISSUE EXAM BY PATHOLOGIST: CPT | Performed by: PATHOLOGY

## 2024-01-04 PROCEDURE — 57454 BX/CURETT OF CERVIX W/SCOPE: CPT | Performed by: OBSTETRICS & GYNECOLOGY

## 2024-01-10 ENCOUNTER — TELEPHONE (OUTPATIENT)
Dept: OBGYN CLINIC | Facility: CLINIC | Age: 39
End: 2024-01-10

## 2024-02-21 PROBLEM — Z01.419 ENCOUNTER FOR GYNECOLOGICAL EXAMINATION (GENERAL) (ROUTINE) WITHOUT ABNORMAL FINDINGS: Status: RESOLVED | Noted: 2019-06-20 | Resolved: 2024-02-21

## 2024-03-26 DIAGNOSIS — F43.23 SITUATIONAL MIXED ANXIETY AND DEPRESSIVE DISORDER: ICD-10-CM

## 2024-03-26 RX ORDER — ESCITALOPRAM OXALATE 10 MG/1
10 TABLET ORAL DAILY
Qty: 90 TABLET | Refills: 0 | Status: SHIPPED | OUTPATIENT
Start: 2024-03-26

## 2024-07-25 DIAGNOSIS — F43.23 SITUATIONAL MIXED ANXIETY AND DEPRESSIVE DISORDER: ICD-10-CM

## 2024-07-26 RX ORDER — ESCITALOPRAM OXALATE 10 MG/1
10 TABLET ORAL DAILY
Qty: 90 TABLET | Refills: 0 | Status: SHIPPED | OUTPATIENT
Start: 2024-07-26

## 2024-11-16 DIAGNOSIS — Z30.41 SURVEILLANCE OF CONTRACEPTIVE PILL: ICD-10-CM

## 2024-11-16 DIAGNOSIS — F43.23 SITUATIONAL MIXED ANXIETY AND DEPRESSIVE DISORDER: ICD-10-CM

## 2024-11-18 RX ORDER — NORETHINDRONE ACETATE AND ETHINYL ESTRADIOL, ETHINYL ESTRADIOL AND FERROUS FUMARATE 1MG-10(24)
KIT ORAL
Qty: 28 TABLET | Refills: 0 | Status: SHIPPED | OUTPATIENT
Start: 2024-11-18

## 2024-11-19 RX ORDER — ESCITALOPRAM OXALATE 10 MG/1
10 TABLET ORAL DAILY
Qty: 90 TABLET | Refills: 0 | Status: SHIPPED | OUTPATIENT
Start: 2024-11-19

## 2025-01-06 DIAGNOSIS — Z30.41 SURVEILLANCE OF CONTRACEPTIVE PILL: ICD-10-CM

## 2025-01-08 RX ORDER — NORETHINDRONE ACETATE AND ETHINYL ESTRADIOL, ETHINYL ESTRADIOL AND FERROUS FUMARATE 1MG-10(24)
KIT ORAL
Qty: 84 TABLET | Refills: 0 | Status: SHIPPED | OUTPATIENT
Start: 2025-01-08

## 2025-01-14 ENCOUNTER — OFFICE VISIT (OUTPATIENT)
Dept: FAMILY MEDICINE CLINIC | Facility: CLINIC | Age: 40
End: 2025-01-14
Payer: COMMERCIAL

## 2025-01-14 VITALS
RESPIRATION RATE: 16 BRPM | BODY MASS INDEX: 32.82 KG/M2 | HEART RATE: 60 BPM | HEIGHT: 65 IN | TEMPERATURE: 98 F | WEIGHT: 197 LBS | DIASTOLIC BLOOD PRESSURE: 82 MMHG | OXYGEN SATURATION: 98 % | SYSTOLIC BLOOD PRESSURE: 112 MMHG

## 2025-01-14 DIAGNOSIS — F43.23 SITUATIONAL MIXED ANXIETY AND DEPRESSIVE DISORDER: ICD-10-CM

## 2025-01-14 DIAGNOSIS — Z00.00 ANNUAL PHYSICAL EXAM: Primary | ICD-10-CM

## 2025-01-14 PROCEDURE — 99395 PREV VISIT EST AGE 18-39: CPT | Performed by: PHYSICIAN ASSISTANT

## 2025-01-14 RX ORDER — ESCITALOPRAM OXALATE 10 MG/1
10 TABLET ORAL DAILY
Qty: 90 TABLET | Refills: 3 | Status: SHIPPED | OUTPATIENT
Start: 2025-01-14

## 2025-01-14 NOTE — PROGRESS NOTES
Adult Annual Physical  Name: Sachi Davila      : 1985      MRN: [id number]  Encounter Provider: Eunice Akers PA-C  Encounter Date: 2025   Encounter department: [facility]    Assessment & Plan  Annual physical exam    Orders:    Lipid Panel with Direct LDL reflex; Future    Comprehensive metabolic panel; Future    CBC and differential; Future    TSH, 3rd generation with Free T4 reflex; Future    Situational mixed anxiety and depressive disorder      Orders:    escitalopram (LEXAPRO) 10 mg tablet; Take 1 tablet (10 mg total) by mouth daily    Immunizations and preventive care screenings were discussed with patient today. Appropriate education was printed on patient's after visit summary.    Counseling:  Alcohol/drug use: discussed moderation in alcohol intake, the recommendations for healthy alcohol use, and avoidance of illicit drug use.  Dental Health: discussed importance of regular tooth brushing, flossing, and dental visits.  Injury prevention: discussed safety/seat belts, safety helmets, smoke detectors, carbon monoxide detectors, and smoking near bedding or upholstery.  Exercise: the importance of regular exercise/physical activity was discussed. Recommend exercise 3-5 times per week for at least 30 minutes.          History of Present Illness     Adult Annual Physical:  Patient presents for annual physical.     Diet and Physical Activity:  - Diet/Nutrition: well balanced diet.    Depression Screening:  - PHQ-2 Score: 0    General Health:  - Sleep: sleeps well and 7-8 hours of sleep on average.  - Vision: goes for regular eye exams.  - Dental: brushes teeth twice daily and regular dental visits.    /GYN Health:  - Follows with GYN: yes.   - Menopause: premenopausal.     Review of Systems   Constitutional: Negative.    HENT: Negative.     Eyes: Negative.    Respiratory: Negative.     Cardiovascular: Negative.    Gastrointestinal: Negative.   "  Endocrine: Negative.    Genitourinary: Negative.    Musculoskeletal: Negative.    Skin: Negative.    Allergic/Immunologic: Negative.    Neurological: Negative.    Hematological: Negative.    Psychiatric/Behavioral: Negative.       Medical History Reviewed by provider this encounter:  Allergies  Med Hx  Surg Hx     .    Objective   /82   Pulse 60   Temp 98 °F (36.7 °C) (Temporal)   Resp 16   Ht 5' 5.25\" (1.657 m)   Wt 89.4 kg (197 lb)   SpO2 98%   BMI 32.53 kg/m²     Physical Exam  Constitutional:       Appearance: Normal appearance. She is well-developed and normal weight.   HENT:      Head: Normocephalic and atraumatic.      Right Ear: Hearing and external ear normal.      Left Ear: Hearing and external ear normal.      Mouth/Throat:      Pharynx: Uvula midline.   Eyes:      Extraocular Movements: Extraocular movements intact.      Conjunctiva/sclera: Conjunctivae normal.      Pupils: Pupils are equal, round, and reactive to light.   Neck:      Thyroid: No thyromegaly.   Cardiovascular:      Rate and Rhythm: Normal rate and regular rhythm.      Pulses: Normal pulses.      Heart sounds: Normal heart sounds. No murmur heard.  Pulmonary:      Effort: Pulmonary effort is normal.      Breath sounds: Normal breath sounds.   Abdominal:      General: Abdomen is flat. Bowel sounds are normal. There is no distension.      Palpations: Abdomen is soft. There is no mass.      Tenderness: There is no abdominal tenderness.   Musculoskeletal:         General: Normal range of motion.      Cervical back: Normal range of motion and neck supple.   Lymphadenopathy:      Cervical: No cervical adenopathy.   Skin:     General: Skin is warm.   Neurological:      General: No focal deficit present.      Mental Status: She is alert and oriented to person, place, and time.      Cranial Nerves: No cranial nerve deficit.      Deep Tendon Reflexes: Reflexes normal.   Psychiatric:         Mood and Affect: Mood normal.         " Behavior: Behavior normal.         Thought Content: Thought content normal.         Judgment: Judgment normal.

## 2025-02-01 ENCOUNTER — APPOINTMENT (OUTPATIENT)
Dept: LAB | Facility: CLINIC | Age: 40
End: 2025-02-01
Payer: COMMERCIAL

## 2025-02-01 DIAGNOSIS — Z00.00 ANNUAL PHYSICAL EXAM: ICD-10-CM

## 2025-02-01 LAB
ALBUMIN SERPL BCG-MCNC: 4 G/DL (ref 3.5–5)
ALP SERPL-CCNC: 47 U/L (ref 34–104)
ALT SERPL W P-5'-P-CCNC: 28 U/L (ref 7–52)
ANION GAP SERPL CALCULATED.3IONS-SCNC: 8 MMOL/L (ref 4–13)
AST SERPL W P-5'-P-CCNC: 24 U/L (ref 13–39)
BASOPHILS # BLD AUTO: 0.05 THOUSANDS/ΜL (ref 0–0.1)
BASOPHILS NFR BLD AUTO: 1 % (ref 0–1)
BILIRUB SERPL-MCNC: 0.39 MG/DL (ref 0.2–1)
BUN SERPL-MCNC: 20 MG/DL (ref 5–25)
CALCIUM SERPL-MCNC: 8.8 MG/DL (ref 8.4–10.2)
CHLORIDE SERPL-SCNC: 107 MMOL/L (ref 96–108)
CHOLEST SERPL-MCNC: 154 MG/DL (ref ?–200)
CO2 SERPL-SCNC: 24 MMOL/L (ref 21–32)
CREAT SERPL-MCNC: 0.8 MG/DL (ref 0.6–1.3)
EOSINOPHIL # BLD AUTO: 0.3 THOUSAND/ΜL (ref 0–0.61)
EOSINOPHIL NFR BLD AUTO: 6 % (ref 0–6)
ERYTHROCYTE [DISTWIDTH] IN BLOOD BY AUTOMATED COUNT: 12.8 % (ref 11.6–15.1)
GFR SERPL CREATININE-BSD FRML MDRD: 93 ML/MIN/1.73SQ M
GLUCOSE P FAST SERPL-MCNC: 97 MG/DL (ref 65–99)
HCT VFR BLD AUTO: 36.6 % (ref 34.8–46.1)
HDLC SERPL-MCNC: 65 MG/DL
HGB BLD-MCNC: 12 G/DL (ref 11.5–15.4)
IMM GRANULOCYTES # BLD AUTO: 0.01 THOUSAND/UL (ref 0–0.2)
IMM GRANULOCYTES NFR BLD AUTO: 0 % (ref 0–2)
LDLC SERPL CALC-MCNC: 80 MG/DL (ref 0–100)
LYMPHOCYTES # BLD AUTO: 1.61 THOUSANDS/ΜL (ref 0.6–4.47)
LYMPHOCYTES NFR BLD AUTO: 34 % (ref 14–44)
MCH RBC QN AUTO: 28.6 PG (ref 26.8–34.3)
MCHC RBC AUTO-ENTMCNC: 32.8 G/DL (ref 31.4–37.4)
MCV RBC AUTO: 87 FL (ref 82–98)
MONOCYTES # BLD AUTO: 0.55 THOUSAND/ΜL (ref 0.17–1.22)
MONOCYTES NFR BLD AUTO: 12 % (ref 4–12)
NEUTROPHILS # BLD AUTO: 2.22 THOUSANDS/ΜL (ref 1.85–7.62)
NEUTS SEG NFR BLD AUTO: 47 % (ref 43–75)
NRBC BLD AUTO-RTO: 0 /100 WBCS
PLATELET # BLD AUTO: 263 THOUSANDS/UL (ref 149–390)
PMV BLD AUTO: 10.4 FL (ref 8.9–12.7)
POTASSIUM SERPL-SCNC: 4.1 MMOL/L (ref 3.5–5.3)
PROT SERPL-MCNC: 6.7 G/DL (ref 6.4–8.4)
RBC # BLD AUTO: 4.19 MILLION/UL (ref 3.81–5.12)
SODIUM SERPL-SCNC: 139 MMOL/L (ref 135–147)
TRIGL SERPL-MCNC: 47 MG/DL (ref ?–150)
TSH SERPL DL<=0.05 MIU/L-ACNC: 0.57 UIU/ML (ref 0.45–4.5)
WBC # BLD AUTO: 4.74 THOUSAND/UL (ref 4.31–10.16)

## 2025-02-01 PROCEDURE — 36415 COLL VENOUS BLD VENIPUNCTURE: CPT

## 2025-02-01 PROCEDURE — 80061 LIPID PANEL: CPT

## 2025-02-01 PROCEDURE — 85025 COMPLETE CBC W/AUTO DIFF WBC: CPT

## 2025-02-01 PROCEDURE — 84443 ASSAY THYROID STIM HORMONE: CPT

## 2025-02-01 PROCEDURE — 80053 COMPREHEN METABOLIC PANEL: CPT

## 2025-02-03 ENCOUNTER — RESULTS FOLLOW-UP (OUTPATIENT)
Dept: FAMILY MEDICINE CLINIC | Facility: CLINIC | Age: 40
End: 2025-02-03

## 2025-03-25 ENCOUNTER — ANNUAL EXAM (OUTPATIENT)
Dept: OBGYN CLINIC | Facility: CLINIC | Age: 40
End: 2025-03-25
Payer: COMMERCIAL

## 2025-03-25 VITALS
BODY MASS INDEX: 33.32 KG/M2 | HEIGHT: 65 IN | SYSTOLIC BLOOD PRESSURE: 118 MMHG | DIASTOLIC BLOOD PRESSURE: 86 MMHG | WEIGHT: 200 LBS

## 2025-03-25 DIAGNOSIS — Z30.41 SURVEILLANCE OF CONTRACEPTIVE PILL: ICD-10-CM

## 2025-03-25 DIAGNOSIS — Z11.3 SCREEN FOR STD (SEXUALLY TRANSMITTED DISEASE): ICD-10-CM

## 2025-03-25 DIAGNOSIS — Z23 NEED FOR HPV VACCINATION: ICD-10-CM

## 2025-03-25 DIAGNOSIS — Z01.419 WELL WOMAN EXAM WITH ROUTINE GYNECOLOGICAL EXAM: Primary | ICD-10-CM

## 2025-03-25 DIAGNOSIS — Z12.4 SCREENING FOR CERVICAL CANCER: ICD-10-CM

## 2025-03-25 PROCEDURE — G0476 HPV COMBO ASSAY CA SCREEN: HCPCS | Performed by: PHYSICIAN ASSISTANT

## 2025-03-25 PROCEDURE — 87591 N.GONORRHOEAE DNA AMP PROB: CPT | Performed by: PHYSICIAN ASSISTANT

## 2025-03-25 PROCEDURE — 90471 IMMUNIZATION ADMIN: CPT | Performed by: PHYSICIAN ASSISTANT

## 2025-03-25 PROCEDURE — G0145 SCR C/V CYTO,THINLAYER,RESCR: HCPCS | Performed by: PHYSICIAN ASSISTANT

## 2025-03-25 PROCEDURE — 99395 PREV VISIT EST AGE 18-39: CPT | Performed by: PHYSICIAN ASSISTANT

## 2025-03-25 PROCEDURE — 87491 CHLMYD TRACH DNA AMP PROBE: CPT | Performed by: PHYSICIAN ASSISTANT

## 2025-03-25 PROCEDURE — 90651 9VHPV VACCINE 2/3 DOSE IM: CPT | Performed by: PHYSICIAN ASSISTANT

## 2025-03-25 RX ORDER — NORETHINDRONE ACETATE AND ETHINYL ESTRADIOL, ETHINYL ESTRADIOL AND FERROUS FUMARATE 1MG-10(24)
KIT ORAL
Qty: 84 TABLET | Refills: 3 | Status: SHIPPED | OUTPATIENT
Start: 2025-03-25

## 2025-03-25 NOTE — ASSESSMENT & PLAN NOTE
Orders:    Norethin-Eth Estrad-Fe Biphas (Lo Loestrin Fe) 1 MG-10 MCG / 10 MCG TABS; TAKE ONE TABLET ORALLY DAILY, SKIPPING PLACEBO PILLS

## 2025-03-25 NOTE — PROGRESS NOTES
Name: Sachi Davila      : 1985      MRN: [id number]  Encounter Provider: Anastasia Nova PA-C  Encounter Date: 3/25/2025   Encounter department: [facility] [place]  :  Assessment & Plan  Well woman exam with routine gynecological exam    Pap collected today     Perineal hygiene reviewed. Weight bearing exercises minium of 150 mins/weekly advised. Kegel exercises recommended.   SBE encouraged, A yearly mammogram is recommended for breast cancer screening starting at age 40. ASCCP guidelines reviewed. Condoms encouraged with all sexual activity to prevent STI's. Gardisil vaccines recommended up to age 45. Calcium/ Vit D dietary requirements discussed.   Advised to call with any issues, all concerns & questions addressed.   See provided information in your after visit summary     F/U Annually and PRN    Results will be released to [facility], if abnormal will call or message to review and discuss treatment plan.     Screening for cervical cancer    Orders:    Liquid-based pap, screening    Surveillance of contraceptive pill    Orders:    Norethin-Eth Estrad-Fe Biphas (Lo Loestrin Fe) 1 MG-10 MCG / 10 MCG TABS; TAKE ONE TABLET ORALLY DAILY, SKIPPING PLACEBO PILLS    Need for HPV vaccination  -3/3 in series received today   Orders:    HPV Vaccine 9 valent IM    Screen for STD (sexually transmitted disease)    Orders:    Chlamydia/GC amplified DNA by PCR        History of Present Illness   HPI    Subjective     Sachi Davila is a 39 y.o.  presenting for annual exam. She is without complaint and does want STD testing today.     She reports that she is amenorrheic with taking her lo loestrin and had a period once within the last year. No breast, vaginal,  complaints. Reported that she only received 2 of her gardasil vaccinations. Is agreeable to getting the last vaccination today.     SCREENING  Last Pap: 23 neg/HPV18+  Last Mammo: 2023 Bi-rads 2     Pap  hx:  2016 pap- neg/neg  3/2019 pap- neg/neg  2022 pap- negative/+18 HPV  2023 colpo- CIN1  2023 pap - neg/+HPV 18  2024 colpo - LSIL    GYN  Amenorrheic on lo loestrin     Sexually active: Yes - single partner - male  Contraception: OCPs  Hx STI: reports HPV    We reviewed ASCCP guidelines for Pap testing today.  Gardasil: She will complete the Gardasil series today.    Denies vaginal discharge, itching, odor, dyspareunia, pelvic pain and vulvar/vaginal symptoms    OB         Complaints: denies   Denies urgency, frequency, hematuria, leakage / change in stream, difficulty urinating.     BREAST  Complaints: denies   Denies: breast lump, breast tenderness, nipple discharge, skin color change, and skin lesion(s)  Personal hx: denies     Pertinent Family Hx:   Family hx of breast cancer: no  Family hx of ovarian cancer: no  Family hx of colon cancer: no    GENERAL  PMH reviewed/updated and is as below.   Patient does follow with a PCP.    SOCIAL  Smoking: no  Alcohol:social  Drug: no  Occupation: teacher at [employer]       Past Medical History:   Diagnosis Date    ACL (anterior cruciate ligament) tear     Anemia     During pregnancy    Anxiety     Varicella     As child       Past Surgical History:   Procedure Laterality Date    KNEE SURGERY      IN ARTHRS AIDED ANT CRUCIATE LIGM RPR/AGMNTJ/RCNSTJ Right 09/15/2017    Procedure: KNEE ARTHROSCOPY WITH ANTERIOR CRUCIATE LIGAMENT RECONSTRUCTION USING HAMSTRING AUTOGRAFT;  Surgeon: Cipriano Mccarthy MD;  Location: [facility];  Service: Orthopedics    WISDOM TOOTH EXTRACTION           Current Outpatient Medications:     escitalopram (LEXAPRO) 10 mg tablet, Take 1 tablet (10 mg total) by mouth daily, Disp: 90 tablet, Rfl: 3    Norethin-Eth Estrad-Fe Biphas (Lo Loestrin Fe) 1 MG-10 MCG / 10 MCG TABS, TAKE ONE TABLET ORALLY DAILY, SKIPPING PLACEBO PILLS, Disp: 84 tablet, Rfl: 3    Allergies   Allergen Reactions    Amoxicillin Hives       Social History  "    Socioeconomic History    Marital status: /Civil Union     Spouse name: Not on file    Number of children: 3    Years of education: Not on file    Highest education level: Not on file   Occupational History    Not on file   Tobacco Use    Smoking status: Never    Smokeless tobacco: Never    Tobacco comments:     No tobaccco/smoke exposure   Vaping Use    Vaping status: Never Used   Substance and Sexual Activity    Alcohol use: Yes     Comment: socially    Drug use: No    Sexual activity: Yes     Partners: Male     Birth control/protection: OCP   Other Topics Concern    Not on file   Social History Narrative    Always uses seat belt    Caffeine Use    Drinks coffee    Feels safe at home    Uses Safety Equipmentd    Lives with spouse    Denied: History of Travel     Social Drivers of Health     Financial Resource Strain: Not on file   Food Insecurity: Not on file   Transportation Needs: Not on file   Physical Activity: Not on file   Stress: Not on file   Social Connections: Not on file   Intimate Partner Violence: Not on file   Housing Stability: Not on file          Review of Systems   Constitutional:  Negative for fatigue, fever and unexpected weight change.   Respiratory:  Negative for cough and shortness of breath.    Cardiovascular:  Negative for chest pain and palpitations.   Gastrointestinal:  Negative for abdominal pain, constipation, diarrhea, nausea and vomiting.   Breast: negative, as above   Genitourinary: negative, as above   Psychiatric: negative for mood changes        Objective   /86 (BP Location: Left arm, Patient Position: Sitting, Cuff Size: Large)   Ht 5' 5.25\" (1.657 m)   Wt 90.7 kg (200 lb)   BMI 33.03 kg/m²      Physical Exam  Constitutional:       Appearance: Normal appearance.   Genitourinary:      Urethral meatus normal.      No lesions in the vagina.      Right Labia: No rash, tenderness, lesions or skin changes.     Left Labia: No tenderness, lesions, skin changes or " rash.     No inguinal adenopathy present in the right or left side.     No vaginal discharge, tenderness or bleeding.      No vaginal prolapse present.       Right Adnexa: not tender, not full and no mass present.     Left Adnexa: not tender, not full and no mass present.     No cervical motion tenderness, friability, lesion, polyp or eversion.      Uterus is not enlarged or tender.      No uterine mass detected.  Breasts:     Right: No mass, nipple discharge, skin change or tenderness.      Left: No mass, nipple discharge, skin change or tenderness.   Neck:      Thyroid: No thyroid mass, thyromegaly or thyroid tenderness.   Cardiovascular:      Rate and Rhythm: Normal rate and regular rhythm.      Heart sounds: Normal heart sounds, S1 normal and S2 normal.      No S3 or S4 sounds.   Pulmonary:      Effort: Pulmonary effort is normal.      Breath sounds: Normal breath sounds. No wheezing, rhonchi or rales.   Abdominal:      General: Abdomen is flat. There is no distension.      Palpations: Abdomen is soft.      Tenderness: There is no abdominal tenderness.      Hernia: There is no hernia in the left inguinal area or right inguinal area.   Musculoskeletal:      Cervical back: Normal range of motion and neck supple.   Lymphadenopathy:      Cervical: No cervical adenopathy.      Upper Body:      Right upper body: No supraclavicular or axillary adenopathy.      Left upper body: No supraclavicular or axillary adenopathy.      Lower Body: No right inguinal and no right inguinal adenopathy. No left inguinal and no left inguinal adenopathy.   Skin:     General: Skin is warm and dry.        History, physical exam and documentation performed by Mirian MOULTON with direct supervision by me

## 2025-03-27 ENCOUNTER — RESULTS FOLLOW-UP (OUTPATIENT)
Dept: OBGYN CLINIC | Facility: MEDICAL CENTER | Age: 40
End: 2025-03-27

## 2025-03-27 LAB
C TRACH DNA SPEC QL NAA+PROBE: NEGATIVE
N GONORRHOEA DNA SPEC QL NAA+PROBE: NEGATIVE

## 2025-03-28 LAB
LAB AP GYN PRIMARY INTERPRETATION: NORMAL
Lab: NORMAL

## (undated) DEVICE — ARTHROSCOPY FLOOR MAT

## (undated) DEVICE — TUBING ARTHROSCOPIC WAVE  MAIN PUMP

## (undated) DEVICE — CUFF TOURNIQUET 30 X 4 IN QUICK CONNECT DISP 1BLA

## (undated) DEVICE — SPONGE PVP SCRUB WING STERILE

## (undated) DEVICE — SUT FIBERWIRE #0 1/2 CIRCLE T-4 38IN AR-7250

## (undated) DEVICE — SUT 2 ORTHOCORD MO7

## (undated) DEVICE — INTENDED FOR TISSUE SEPARATION, AND OTHER PROCEDURES THAT REQUIRE A SHARP SURGICAL BLADE TO PUNCTURE OR CUT.: Brand: BARD-PARKER SAFETY BLADES SIZE 15, STERILE

## (undated) DEVICE — ACE WRAP 6 IN UNSTERILE

## (undated) DEVICE — TUBING SUCTION 5MM X 12 FT

## (undated) DEVICE — DRAPE SHEET THREE QUARTER

## (undated) DEVICE — GLOVE INDICATOR PI UNDERGLOVE SZ 7.5 BLUE

## (undated) DEVICE — SUT MONOCRYL 4-0 PS-2 18 IN Y496G

## (undated) DEVICE — ACE WRAP 4 IN UNSTERILE

## (undated) DEVICE — 3M™ STERI-STRIP™ REINFORCED ADHESIVE SKIN CLOSURES, R1547, 1/2 IN X 4 IN (12 MM X 100 MM), 6 STRIPS/ENVELOPE: Brand: 3M™ STERI-STRIP™

## (undated) DEVICE — OCCLUSIVE GAUZE STRIP,3% BISMUTH TRIBROMOPHENATE IN PETROLATUM BLEND: Brand: XEROFORM

## (undated) DEVICE — PADDING CAST 4 IN  COTTON STRL

## (undated) DEVICE — BLADE SHAVER DISSECTOR 3.5MM 13CM COOLCUT

## (undated) DEVICE — INTENDED FOR TISSUE SEPARATION, AND OTHER PROCEDURES THAT REQUIRE A SHARP SURGICAL BLADE TO PUNCTURE OR CUT.: Brand: BARD-PARKER SAFETY BLADES SIZE 11, STERILE

## (undated) DEVICE — ACL DISPOSABLES KIT 18 INCHES BAYONET POINT PASSING PIN (2.4 X 45 CM), 14 INCHES DRILL TIP PASSING PIN (2.4 X 35 CM), 1.1 MM X 15 INCHES (38 CM) NITINOL GUIDEWIRE, THREADED TIBIAL CANNULA, MALLEABLE GRAFT RETRACTOR, MARKING PEN, RULER (PERCENT ACCURACY = PLUS OR MINUS 1 PERCENT)

## (undated) DEVICE — SUT VICRYL 2-0 CTB-1 36 IN JB945

## (undated) DEVICE — CUTTER FLIPCUTTER II SHORT 8MM

## (undated) DEVICE — INTENDED FOR TISSUE SEPARATION, AND OTHER PROCEDURES THAT REQUIRE A SHARP SURGICAL BLADE TO PUNCTURE OR CUT.: Brand: BARD-PARKER SAFETY BLADES SIZE 10, STERILE

## (undated) DEVICE — VAPR COOLPULSE 90 ELECTRODE 90 DEGREES SUCTION WITH INTEGRATED HANDPIECE: Brand: VAPR COOLPULSE

## (undated) DEVICE — SYRINGE 50ML LL

## (undated) DEVICE — IMPERVIOUS STOCKINETTE: Brand: DEROYAL

## (undated) DEVICE — GLOVE SRG BIOGEL 7.5

## (undated) DEVICE — DRAPE SURGIKIT SADDLE BAG

## (undated) DEVICE — CHLORAPREP HI-LITE 26ML ORANGE

## (undated) DEVICE — BLADE SHAVER TORPEDO 4MM 13CM  COOLCUT

## (undated) DEVICE — U-DRAPE: Brand: CONVERTORS

## (undated) DEVICE — HEAVY DUTY TABLE COVER: Brand: CONVERTORS

## (undated) DEVICE — GAUZE SPONGES,16 PLY: Brand: CURITY

## (undated) DEVICE — SUT VICRYL 1 CTB-1 36 IN JB947

## (undated) DEVICE — PACK UNIVERSAL ARTHRSCOPY PBDS

## (undated) DEVICE — EXTREMITY DRAPE W/ARMBOARD COVERS: Brand: CONVERTORS

## (undated) DEVICE — SUT FIBERSTICK #2 50IN BLUE

## (undated) DEVICE — LIGHT GLOVE GREEN